# Patient Record
Sex: MALE | Race: ASIAN | Employment: FULL TIME | URBAN - METROPOLITAN AREA
[De-identification: names, ages, dates, MRNs, and addresses within clinical notes are randomized per-mention and may not be internally consistent; named-entity substitution may affect disease eponyms.]

---

## 2018-04-24 ENCOUNTER — OFFICE VISIT (OUTPATIENT)
Dept: FAMILY MEDICINE CLINIC | Age: 58
End: 2018-04-24

## 2018-04-24 VITALS
SYSTOLIC BLOOD PRESSURE: 120 MMHG | DIASTOLIC BLOOD PRESSURE: 80 MMHG | HEART RATE: 57 BPM | WEIGHT: 148 LBS | HEIGHT: 65 IN | BODY MASS INDEX: 24.66 KG/M2 | OXYGEN SATURATION: 96 %

## 2018-04-24 DIAGNOSIS — K50.90 CROHN'S DISEASE WITHOUT COMPLICATION, UNSPECIFIED GASTROINTESTINAL TRACT LOCATION (HCC): ICD-10-CM

## 2018-04-24 DIAGNOSIS — R53.83 FATIGUE, UNSPECIFIED TYPE: ICD-10-CM

## 2018-04-24 DIAGNOSIS — Z00.00 ANNUAL PHYSICAL EXAM: Primary | ICD-10-CM

## 2018-04-24 DIAGNOSIS — Z12.11 COLON CANCER SCREENING: ICD-10-CM

## 2018-04-24 PROCEDURE — 99386 PREV VISIT NEW AGE 40-64: CPT | Performed by: FAMILY MEDICINE

## 2018-04-24 RX ORDER — BUPRENORPHINE HYDROCHLORIDE AND NALOXONE HYDROCHLORIDE DIHYDRATE 8; 2 MG/1; MG/1
TABLET SUBLINGUAL
COMMUNITY
Start: 2018-03-12

## 2018-04-24 ASSESSMENT — PATIENT HEALTH QUESTIONNAIRE - PHQ9
1. LITTLE INTEREST OR PLEASURE IN DOING THINGS: 0
SUM OF ALL RESPONSES TO PHQ9 QUESTIONS 1 & 2: 0
2. FEELING DOWN, DEPRESSED OR HOPELESS: 0
SUM OF ALL RESPONSES TO PHQ QUESTIONS 1-9: 0

## 2018-05-01 ENCOUNTER — TELEPHONE (OUTPATIENT)
Dept: FAMILY MEDICINE CLINIC | Age: 58
End: 2018-05-01

## 2018-05-01 ENCOUNTER — NURSE ONLY (OUTPATIENT)
Dept: FAMILY MEDICINE CLINIC | Age: 58
End: 2018-05-01

## 2018-05-01 DIAGNOSIS — Z12.11 COLON CANCER SCREENING: ICD-10-CM

## 2018-05-01 DIAGNOSIS — K92.1 BLOOD IN STOOL: Primary | ICD-10-CM

## 2018-05-01 DIAGNOSIS — R53.83 FATIGUE, UNSPECIFIED TYPE: ICD-10-CM

## 2018-05-01 DIAGNOSIS — Z00.00 ANNUAL PHYSICAL EXAM: ICD-10-CM

## 2018-05-01 LAB
A/G RATIO: 1 (ref 1.1–2.2)
ALBUMIN SERPL-MCNC: 3.7 G/DL (ref 3.4–5)
ALP BLD-CCNC: 73 U/L (ref 40–129)
ALT SERPL-CCNC: <5 U/L (ref 10–40)
ANION GAP SERPL CALCULATED.3IONS-SCNC: 16 MMOL/L (ref 3–16)
AST SERPL-CCNC: 11 U/L (ref 15–37)
BASOPHILS ABSOLUTE: 0 K/UL (ref 0–0.2)
BASOPHILS RELATIVE PERCENT: 0.5 %
BILIRUB SERPL-MCNC: 0.3 MG/DL (ref 0–1)
BUN BLDV-MCNC: 8 MG/DL (ref 7–20)
CALCIUM SERPL-MCNC: 9.3 MG/DL (ref 8.3–10.6)
CHLORIDE BLD-SCNC: 101 MMOL/L (ref 99–110)
CHOLESTEROL, FASTING: 120 MG/DL (ref 0–199)
CO2: 25 MMOL/L (ref 21–32)
CONTROL: NORMAL
CREAT SERPL-MCNC: 0.9 MG/DL (ref 0.9–1.3)
EOSINOPHILS ABSOLUTE: 0.1 K/UL (ref 0–0.6)
EOSINOPHILS RELATIVE PERCENT: 1.8 %
FOLATE: 2.81 NG/ML (ref 4.78–24.2)
GFR AFRICAN AMERICAN: >60
GFR NON-AFRICAN AMERICAN: >60
GLOBULIN: 3.7 G/DL
GLUCOSE BLD-MCNC: 108 MG/DL (ref 70–99)
HCT VFR BLD CALC: 44.1 % (ref 40.5–52.5)
HDLC SERPL-MCNC: 42 MG/DL (ref 40–60)
HEMOCCULT STL QL: POSITIVE
HEMOGLOBIN: 14.4 G/DL (ref 13.5–17.5)
LDL CHOLESTEROL CALCULATED: 60 MG/DL
LYMPHOCYTES ABSOLUTE: 1.5 K/UL (ref 1–5.1)
LYMPHOCYTES RELATIVE PERCENT: 20.3 %
MCH RBC QN AUTO: 28.3 PG (ref 26–34)
MCHC RBC AUTO-ENTMCNC: 32.7 G/DL (ref 31–36)
MCV RBC AUTO: 86.5 FL (ref 80–100)
MONOCYTES ABSOLUTE: 0.5 K/UL (ref 0–1.3)
MONOCYTES RELATIVE PERCENT: 6.2 %
NEUTROPHILS ABSOLUTE: 5.2 K/UL (ref 1.7–7.7)
NEUTROPHILS RELATIVE PERCENT: 71.2 %
PDW BLD-RTO: 14.6 % (ref 12.4–15.4)
PLATELET # BLD: 339 K/UL (ref 135–450)
PMV BLD AUTO: 7.7 FL (ref 5–10.5)
POTASSIUM SERPL-SCNC: 4.5 MMOL/L (ref 3.5–5.1)
RBC # BLD: 5.1 M/UL (ref 4.2–5.9)
SODIUM BLD-SCNC: 142 MMOL/L (ref 136–145)
TOTAL PROTEIN: 7.4 G/DL (ref 6.4–8.2)
TRIGLYCERIDE, FASTING: 90 MG/DL (ref 0–150)
TSH REFLEX: 2.05 UIU/ML (ref 0.27–4.2)
VITAMIN B-12: 246 PG/ML (ref 211–911)
VITAMIN D 25-HYDROXY: 14.2 NG/ML
VLDLC SERPL CALC-MCNC: 18 MG/DL
WBC # BLD: 7.4 K/UL (ref 4–11)

## 2018-05-01 PROCEDURE — 82274 ASSAY TEST FOR BLOOD FECAL: CPT | Performed by: FAMILY MEDICINE

## 2018-05-02 LAB
ESTIMATED AVERAGE GLUCOSE: 122.6 MG/DL
HBA1C MFR BLD: 5.9 %

## 2018-05-08 ENCOUNTER — OFFICE VISIT (OUTPATIENT)
Dept: FAMILY MEDICINE CLINIC | Age: 58
End: 2018-05-08

## 2018-05-08 ENCOUNTER — TELEPHONE (OUTPATIENT)
Dept: FAMILY MEDICINE CLINIC | Age: 58
End: 2018-05-08

## 2018-05-08 VITALS
BODY MASS INDEX: 25.29 KG/M2 | DIASTOLIC BLOOD PRESSURE: 70 MMHG | OXYGEN SATURATION: 98 % | WEIGHT: 152 LBS | SYSTOLIC BLOOD PRESSURE: 120 MMHG | TEMPERATURE: 98.1 F | HEART RATE: 78 BPM

## 2018-05-08 DIAGNOSIS — R10.9 ABDOMINAL PAIN, UNSPECIFIED ABDOMINAL LOCATION: ICD-10-CM

## 2018-05-08 DIAGNOSIS — E55.9 VITAMIN D DEFICIENCY: ICD-10-CM

## 2018-05-08 DIAGNOSIS — R19.5 OCCULT GI BLEEDING: ICD-10-CM

## 2018-05-08 DIAGNOSIS — R73.03 PREDIABETES: Primary | ICD-10-CM

## 2018-05-08 DIAGNOSIS — E53.8 VITAMIN B12 DEFICIENCY: ICD-10-CM

## 2018-05-08 PROCEDURE — 99214 OFFICE O/P EST MOD 30 MIN: CPT | Performed by: FAMILY MEDICINE

## 2018-05-08 PROCEDURE — G8427 DOCREV CUR MEDS BY ELIG CLIN: HCPCS | Performed by: FAMILY MEDICINE

## 2018-05-08 PROCEDURE — G8419 CALC BMI OUT NRM PARAM NOF/U: HCPCS | Performed by: FAMILY MEDICINE

## 2018-05-08 PROCEDURE — 96372 THER/PROPH/DIAG INJ SC/IM: CPT | Performed by: FAMILY MEDICINE

## 2018-05-08 PROCEDURE — 3017F COLORECTAL CA SCREEN DOC REV: CPT | Performed by: FAMILY MEDICINE

## 2018-05-08 PROCEDURE — 4004F PT TOBACCO SCREEN RCVD TLK: CPT | Performed by: FAMILY MEDICINE

## 2018-05-08 RX ORDER — CYANOCOBALAMIN 1000 UG/ML
1000 INJECTION INTRAMUSCULAR; SUBCUTANEOUS
Status: SHIPPED | OUTPATIENT
Start: 2018-05-08 | End: 2018-08-06

## 2018-05-08 RX ADMIN — CYANOCOBALAMIN 1000 MCG: 1000 INJECTION INTRAMUSCULAR; SUBCUTANEOUS at 15:28

## 2018-05-18 ENCOUNTER — TELEPHONE (OUTPATIENT)
Dept: FAMILY MEDICINE CLINIC | Age: 58
End: 2018-05-18

## 2020-02-07 ENCOUNTER — OFFICE VISIT (OUTPATIENT)
Dept: FAMILY MEDICINE CLINIC | Age: 60
End: 2020-02-07
Payer: COMMERCIAL

## 2020-02-07 VITALS
HEIGHT: 65 IN | WEIGHT: 151 LBS | BODY MASS INDEX: 25.16 KG/M2 | DIASTOLIC BLOOD PRESSURE: 78 MMHG | SYSTOLIC BLOOD PRESSURE: 110 MMHG | OXYGEN SATURATION: 100 % | HEART RATE: 99 BPM

## 2020-02-07 DIAGNOSIS — Z00.00 ANNUAL PHYSICAL EXAM: ICD-10-CM

## 2020-02-07 LAB
A/G RATIO: 1.3 (ref 1.1–2.2)
ALBUMIN SERPL-MCNC: 4.4 G/DL (ref 3.4–5)
ALP BLD-CCNC: 75 U/L (ref 40–129)
ALT SERPL-CCNC: 6 U/L (ref 10–40)
ANION GAP SERPL CALCULATED.3IONS-SCNC: 14 MMOL/L (ref 3–16)
AST SERPL-CCNC: 12 U/L (ref 15–37)
BASOPHILS ABSOLUTE: 0 K/UL (ref 0–0.2)
BASOPHILS RELATIVE PERCENT: 0.5 %
BILIRUB SERPL-MCNC: 0.3 MG/DL (ref 0–1)
BUN BLDV-MCNC: 11 MG/DL (ref 7–20)
CALCIUM SERPL-MCNC: 10 MG/DL (ref 8.3–10.6)
CHLORIDE BLD-SCNC: 103 MMOL/L (ref 99–110)
CHOLESTEROL, FASTING: 112 MG/DL (ref 0–199)
CO2: 21 MMOL/L (ref 21–32)
CREAT SERPL-MCNC: 0.9 MG/DL (ref 0.9–1.3)
EOSINOPHILS ABSOLUTE: 0 K/UL (ref 0–0.6)
EOSINOPHILS RELATIVE PERCENT: 0.7 %
GFR AFRICAN AMERICAN: >60
GFR NON-AFRICAN AMERICAN: >60
GLOBULIN: 3.3 G/DL
GLUCOSE BLD-MCNC: 114 MG/DL (ref 70–99)
HCT VFR BLD CALC: 44 % (ref 40.5–52.5)
HDLC SERPL-MCNC: 51 MG/DL (ref 40–60)
HEMOGLOBIN: 15.1 G/DL (ref 13.5–17.5)
LDL CHOLESTEROL CALCULATED: 49 MG/DL
LYMPHOCYTES ABSOLUTE: 1.6 K/UL (ref 1–5.1)
LYMPHOCYTES RELATIVE PERCENT: 24.2 %
MCH RBC QN AUTO: 29.4 PG (ref 26–34)
MCHC RBC AUTO-ENTMCNC: 34.3 G/DL (ref 31–36)
MCV RBC AUTO: 85.6 FL (ref 80–100)
MONOCYTES ABSOLUTE: 0.4 K/UL (ref 0–1.3)
MONOCYTES RELATIVE PERCENT: 6.2 %
NEUTROPHILS ABSOLUTE: 4.7 K/UL (ref 1.7–7.7)
NEUTROPHILS RELATIVE PERCENT: 68.4 %
PDW BLD-RTO: 14.2 % (ref 12.4–15.4)
PLATELET # BLD: 285 K/UL (ref 135–450)
PMV BLD AUTO: 7.9 FL (ref 5–10.5)
POTASSIUM SERPL-SCNC: 4.1 MMOL/L (ref 3.5–5.1)
RBC # BLD: 5.14 M/UL (ref 4.2–5.9)
SODIUM BLD-SCNC: 138 MMOL/L (ref 136–145)
TOTAL PROTEIN: 7.7 G/DL (ref 6.4–8.2)
TRIGLYCERIDE, FASTING: 61 MG/DL (ref 0–150)
TSH REFLEX: 2.26 UIU/ML (ref 0.27–4.2)
VLDLC SERPL CALC-MCNC: 12 MG/DL
WBC # BLD: 6.8 K/UL (ref 4–11)

## 2020-02-07 PROCEDURE — 90715 TDAP VACCINE 7 YRS/> IM: CPT | Performed by: FAMILY MEDICINE

## 2020-02-07 PROCEDURE — 96372 THER/PROPH/DIAG INJ SC/IM: CPT | Performed by: FAMILY MEDICINE

## 2020-02-07 PROCEDURE — 90471 IMMUNIZATION ADMIN: CPT | Performed by: FAMILY MEDICINE

## 2020-02-07 PROCEDURE — 90472 IMMUNIZATION ADMIN EACH ADD: CPT | Performed by: FAMILY MEDICINE

## 2020-02-07 PROCEDURE — 99214 OFFICE O/P EST MOD 30 MIN: CPT | Performed by: FAMILY MEDICINE

## 2020-02-07 PROCEDURE — 90686 IIV4 VACC NO PRSV 0.5 ML IM: CPT | Performed by: FAMILY MEDICINE

## 2020-02-07 RX ORDER — CYANOCOBALAMIN 1000 UG/ML
1000 INJECTION INTRAMUSCULAR; SUBCUTANEOUS ONCE
Qty: 1 ML | Refills: 0 | Status: SHIPPED | OUTPATIENT
Start: 2020-02-07 | End: 2020-02-07 | Stop reason: CLARIF

## 2020-02-07 RX ORDER — CYANOCOBALAMIN 1000 UG/ML
1000 INJECTION INTRAMUSCULAR; SUBCUTANEOUS ONCE
Status: COMPLETED | OUTPATIENT
Start: 2020-02-07 | End: 2020-02-07

## 2020-02-07 RX ADMIN — CYANOCOBALAMIN 1000 MCG: 1000 INJECTION INTRAMUSCULAR; SUBCUTANEOUS at 11:59

## 2020-02-07 ASSESSMENT — PATIENT HEALTH QUESTIONNAIRE - PHQ9
2. FEELING DOWN, DEPRESSED OR HOPELESS: 0
SUM OF ALL RESPONSES TO PHQ9 QUESTIONS 1 & 2: 0
SUM OF ALL RESPONSES TO PHQ QUESTIONS 1-9: 0
1. LITTLE INTEREST OR PLEASURE IN DOING THINGS: 0
SUM OF ALL RESPONSES TO PHQ QUESTIONS 1-9: 0

## 2020-02-07 NOTE — PROGRESS NOTES
deficits, normal gait, normal speech      Assessment/Plan     61-year-old male with fatigue and diarrhea and history of Crohn's disease. Concerning for flare of Crohn's disease. Will refer to GI for further evaluation and treatment, as well as routine screening colonoscopy. Will check routine labs, including CBC and TSH to rule out organic cause of fatigue. Patient reports worsening memory. Given his age, is concerning for early onset dementia. Will check an MRI to rule out intracranial process. May ultimately need referral to neurology. Discharged in stable condition at 11:46 AM.    1. Need for prophylactic vaccination against Streptococcus pneumoniae (pneumococcus)    - Pneumococcal polysaccharide vaccine 23-valent PPSV23    2. Need for prophylactic vaccination against diphtheria-tetanus-pertussis (DTP)    - Tdap (age 6y and older) IM (Boostrix)    3. Annual physical exam    - CBC Auto Differential; Future  - Comprehensive Metabolic Panel; Future  - Hemoglobin A1C; Future  - Lipid, Fasting; Future  - TSH with Reflex; Future    4. Colon cancer screening      5. Vitamin B12 deficiency    - cyanocobalamin 1000 MCG/ML injection; Inject 1 mL into the muscle once for 1 dose  Dispense: 1 mL; Refill: 0    6. Memory problem    - MRI Brain W Contrast; Future    7.  Crohn's disease with complication, unspecified gastrointestinal tract location [facility])    - Shikha Barkley MD, Gastroenterology, [facility]         Orders Placed This Encounter   Procedures    MRI Brain W Contrast     Standing Status:   Future     Standing Expiration Date:   2/7/2021     Order Specific Question:   Reason for exam:     Answer:   Memory problem    Tdap (age 6y and older) IM (Boostrix)    Pneumococcal polysaccharide vaccine 23-valent PPSV23    INFLUENZA, QUADV, 3 YRS AND OLDER, IM PF, PREFILL SYR OR SDV, 0.5ML (AFLURIA QUADV, PF)    CBC Auto Differential     Standing Status:   Future     Standing Expiration Date: 2/7/2021    Comprehensive Metabolic Panel     Standing Status:   Future     Standing Expiration Date:   2/7/2021    Hemoglobin A1C     Standing Status:   Future     Standing Expiration Date:   2/7/2021    Lipid, Fasting     Standing Status:   Future     Standing Expiration Date:   2/7/2021    TSH with Reflex     Standing Status:   Future     Standing Expiration Date:   2/7/2021    LILIAM Giles MD, Gastroenterology, [facility]     Referral Priority:   Routine     Referral Type:   Eval and Treat     Referral Reason:   Specialty Services Required     Referred to Provider:   Ira Yoder MD     Requested Specialty:   Gastroenterology     Number of Visits Requested:   1       Return if symptoms worsen or fail to improve.     Martha Avila MD    2/7/2020  11:38 AM

## 2020-02-08 LAB
ESTIMATED AVERAGE GLUCOSE: 119.8 MG/DL
HBA1C MFR BLD: 5.8 %

## 2020-02-10 ENCOUNTER — TELEPHONE (OUTPATIENT)
Dept: FAMILY MEDICINE CLINIC | Age: 60
End: 2020-02-10

## 2020-02-25 ENCOUNTER — HOSPITAL ENCOUNTER (OUTPATIENT)
Dept: MRI IMAGING | Age: 60
Discharge: HOME | End: 2020-02-25
Payer: COMMERCIAL

## 2020-02-28 ENCOUNTER — TELEPHONE (OUTPATIENT)
Dept: FAMILY MEDICINE CLINIC | Age: 60
End: 2020-02-28

## 2020-03-10 ENCOUNTER — NURSE ONLY (OUTPATIENT)
Dept: FAMILY MEDICINE CLINIC | Age: 60
End: 2020-03-10
Payer: COMMERCIAL

## 2020-03-10 ENCOUNTER — TELEPHONE (OUTPATIENT)
Dept: FAMILY MEDICINE CLINIC | Age: 60
End: 2020-03-10

## 2020-03-10 PROCEDURE — 96372 THER/PROPH/DIAG INJ SC/IM: CPT | Performed by: FAMILY MEDICINE

## 2020-03-10 RX ORDER — CYANOCOBALAMIN 1000 UG/ML
1000 INJECTION INTRAMUSCULAR; SUBCUTANEOUS ONCE
Status: COMPLETED | OUTPATIENT
Start: 2020-03-10 | End: 2020-03-10

## 2020-03-10 RX ADMIN — CYANOCOBALAMIN 1000 MCG: 1000 INJECTION INTRAMUSCULAR; SUBCUTANEOUS at 12:59

## 2020-03-10 NOTE — TELEPHONE ENCOUNTER
Pt wants to know if you can result his colonoscopy? He also wants to know how you would like him to take prednisone?

## 2022-03-10 ENCOUNTER — OFFICE VISIT (OUTPATIENT)
Dept: FAMILY MEDICINE CLINIC | Age: 62
End: 2022-03-10
Payer: MEDICAID

## 2022-03-10 VITALS
BODY MASS INDEX: 25.66 KG/M2 | OXYGEN SATURATION: 97 % | HEART RATE: 71 BPM | SYSTOLIC BLOOD PRESSURE: 112 MMHG | HEIGHT: 65 IN | WEIGHT: 154 LBS | DIASTOLIC BLOOD PRESSURE: 76 MMHG

## 2022-03-10 DIAGNOSIS — M54.50 BILATERAL LOW BACK PAIN, UNSPECIFIED CHRONICITY, UNSPECIFIED WHETHER SCIATICA PRESENT: Primary | ICD-10-CM

## 2022-03-10 LAB
BILIRUBIN, POC: NORMAL
BLOOD URINE, POC: NORMAL
CLARITY, POC: NORMAL
COLOR, POC: YELLOW
GLUCOSE URINE, POC: NORMAL
KETONES, POC: NORMAL
LEUKOCYTE EST, POC: NORMAL
NITRITE, POC: NORMAL
PH, POC: 6
PROTEIN, POC: NORMAL
SPECIFIC GRAVITY, POC: 1.02
UROBILINOGEN, POC: 0.2

## 2022-03-10 PROCEDURE — 4004F PT TOBACCO SCREEN RCVD TLK: CPT | Performed by: NURSE PRACTITIONER

## 2022-03-10 PROCEDURE — 99213 OFFICE O/P EST LOW 20 MIN: CPT | Performed by: NURSE PRACTITIONER

## 2022-03-10 PROCEDURE — G8484 FLU IMMUNIZE NO ADMIN: HCPCS | Performed by: NURSE PRACTITIONER

## 2022-03-10 PROCEDURE — 81002 URINALYSIS NONAUTO W/O SCOPE: CPT | Performed by: NURSE PRACTITIONER

## 2022-03-10 PROCEDURE — G8419 CALC BMI OUT NRM PARAM NOF/U: HCPCS | Performed by: NURSE PRACTITIONER

## 2022-03-10 PROCEDURE — 3017F COLORECTAL CA SCREEN DOC REV: CPT | Performed by: NURSE PRACTITIONER

## 2022-03-10 PROCEDURE — G8427 DOCREV CUR MEDS BY ELIG CLIN: HCPCS | Performed by: NURSE PRACTITIONER

## 2022-03-10 RX ORDER — PREDNISONE 10 MG/1
TABLET ORAL
Qty: 36 TABLET | Refills: 0 | Status: SHIPPED | OUTPATIENT
Start: 2022-03-10 | End: 2022-03-19

## 2022-03-10 SDOH — ECONOMIC STABILITY: FOOD INSECURITY: WITHIN THE PAST 12 MONTHS, THE FOOD YOU BOUGHT JUST DIDN'T LAST AND YOU DIDN'T HAVE MONEY TO GET MORE.: NEVER TRUE

## 2022-03-10 SDOH — ECONOMIC STABILITY: FOOD INSECURITY: WITHIN THE PAST 12 MONTHS, YOU WORRIED THAT YOUR FOOD WOULD RUN OUT BEFORE YOU GOT MONEY TO BUY MORE.: NEVER TRUE

## 2022-03-10 ASSESSMENT — PATIENT HEALTH QUESTIONNAIRE - PHQ9
4. FEELING TIRED OR HAVING LITTLE ENERGY: 1
2. FEELING DOWN, DEPRESSED OR HOPELESS: 0
1. LITTLE INTEREST OR PLEASURE IN DOING THINGS: 0
7. TROUBLE CONCENTRATING ON THINGS, SUCH AS READING THE NEWSPAPER OR WATCHING TELEVISION: 1
10. IF YOU CHECKED OFF ANY PROBLEMS, HOW DIFFICULT HAVE THESE PROBLEMS MADE IT FOR YOU TO DO YOUR WORK, TAKE CARE OF THINGS AT HOME, OR GET ALONG WITH OTHER PEOPLE: 0
SUM OF ALL RESPONSES TO PHQ QUESTIONS 1-9: 3
SUM OF ALL RESPONSES TO PHQ9 QUESTIONS 1 & 2: 0
9. THOUGHTS THAT YOU WOULD BE BETTER OFF DEAD, OR OF HURTING YOURSELF: 0
3. TROUBLE FALLING OR STAYING ASLEEP: 0
6. FEELING BAD ABOUT YOURSELF - OR THAT YOU ARE A FAILURE OR HAVE LET YOURSELF OR YOUR FAMILY DOWN: 0
SUM OF ALL RESPONSES TO PHQ QUESTIONS 1-9: 3
SUM OF ALL RESPONSES TO PHQ QUESTIONS 1-9: 3
8. MOVING OR SPEAKING SO SLOWLY THAT OTHER PEOPLE COULD HAVE NOTICED. OR THE OPPOSITE, BEING SO FIGETY OR RESTLESS THAT YOU HAVE BEEN MOVING AROUND A LOT MORE THAN USUAL: 0
5. POOR APPETITE OR OVEREATING: 1
SUM OF ALL RESPONSES TO PHQ QUESTIONS 1-9: 3

## 2022-03-10 ASSESSMENT — ENCOUNTER SYMPTOMS
BACK PAIN: 1
GASTROINTESTINAL NEGATIVE: 1
RESPIRATORY NEGATIVE: 1

## 2022-03-10 ASSESSMENT — SOCIAL DETERMINANTS OF HEALTH (SDOH): HOW HARD IS IT FOR YOU TO PAY FOR THE VERY BASICS LIKE FOOD, HOUSING, MEDICAL CARE, AND HEATING?: SOMEWHAT HARD

## 2022-03-10 NOTE — PROGRESS NOTES
3/10/2022     Rita Hernandez (:  1960) is a 62 y.o. male, here for evaluation of the following medical concerns:    Chief Complaint   Patient presents with    Lower Back Pain     sharp pains, right side hurts worse, x2 days     Back Pain:  States started with right sided low back pain two days ago. Has had this in the past one time prior and took steroids and the discomfort was relieved. Denies injury or fever. He believes he has a kidney stone, however, he has never had a kidney stone. He does admit he has been lifting 50 pound rugs at work by himself recently. The pain is only when he moves. He denies radiation to the buttock or leg. He denies radiation around to the lower abdomen or scrotum. Denies urinary or bowel issues. Review of Systems   Constitutional: Negative. Respiratory: Negative. Cardiovascular: Negative. Gastrointestinal: Negative. Genitourinary: Negative. Negative for dysuria, flank pain, hematuria, penile discharge, penile pain, penile swelling, scrotal swelling, testicular pain and urgency. Musculoskeletal: Positive for back pain. Neurological: Negative. Prior to Visit Medications    Medication Sig Taking? Authorizing Provider   predniSONE (DELTASONE) 10 MG tablet Take 6 tablets by mouth daily for 3 days, THEN 4 tablets daily for 3 days, THEN 2 tablets daily for 3 days.  Yes FRANK Farah - CNP   buprenorphine-naloxone (SUBOXONE) 8-2 MG SUBL SL tablet  Yes Historical Provider, MD        Social History     Tobacco Use    Smoking status: Current Every Day Smoker     Packs/day: 1.50     Years: 20.00     Pack years: 30.00    Smokeless tobacco: Never Used   Substance Use Topics    Alcohol use: Yes     Comment: about once a month    Drug use: No     Comment: former         Vitals:    03/10/22 1444   BP: 112/76   Pulse: 71   SpO2: 97%   Weight: 154 lb (69.9 kg)   Height: 5' 5\" (1.651 m)     Estimated body mass index is 25.63 kg/m² as calculated from the following:    Height as of this encounter: 5' 5\" (1.651 m). Weight as of this encounter: 154 lb (69.9 kg). Physical Exam  Vitals and nursing note reviewed. Constitutional:       General: He is not in acute distress. Appearance: Normal appearance. He is normal weight. He is not ill-appearing. Cardiovascular:      Rate and Rhythm: Normal rate and regular rhythm. Heart sounds: Normal heart sounds, S1 normal and S2 normal. No murmur heard. Pulmonary:      Effort: Pulmonary effort is normal.      Breath sounds: Normal breath sounds and air entry. Abdominal:      General: Abdomen is flat. Bowel sounds are normal.      Palpations: Abdomen is soft. Tenderness: There is no abdominal tenderness. There is right CVA tenderness. There is no left CVA tenderness. Comments: Palpating on the right CVA creates spasms   Musculoskeletal:      Lumbar back: Spasms and tenderness present. No swelling, edema, deformity, signs of trauma or bony tenderness. Decreased range of motion. Negative right straight leg raise test and negative left straight leg raise test. No scoliosis. Back:    Skin:     General: Skin is warm and dry. Capillary Refill: Capillary refill takes less than 2 seconds. Neurological:      General: No focal deficit present. Mental Status: He is alert. Psychiatric:         Mood and Affect: Mood normal.         ASSESSMENT/PLAN:  1. Bilateral low back pain, unspecified chronicity, unspecified whether sciatica present  Unlikely renal stone, however, his urine is positive for blood so can not rule this out although it is not a classic presentation so I will order a KUB and a urine culture. Patient already takes Suboxone for pain. Believe more likely this is a muscle strain, will treat with steroids as this has helped in the past with this issue. Will wait for KUB results and culture.   - POCT Urinalysis no Micro  - Culture, Urine  - XR ABDOMEN (KUB) (SINGLE AP VIEW); Future  - predniSONE (DELTASONE) 10 MG tablet; Take 6 tablets by mouth daily for 3 days, THEN 4 tablets daily for 3 days, THEN 2 tablets daily for 3 days. Dispense: 36 tablet; Refill: 0    Return in about 4 weeks (around 4/7/2022).

## 2022-03-10 NOTE — PATIENT INSTRUCTIONS
back, point your toes, or bend either knee. Keep one heel touching the floor and the other heel touching the wall. 4. Repeat with your other leg. 5. Do 2 to 4 times for each leg. Hip flexor stretch    1. Kneel on the floor with one knee bent and one leg behind you. Place your forward knee over your foot. Keep your other knee touching the floor. 2. Slowly push your hips forward until you feel a stretch in the upper thigh of your rear leg. 3. Hold the stretch for at least 15 to 30 seconds. Repeat with your other leg. 4. Do 2 to 4 times on each side. Wall sit    1. Stand with your back 10 to 12 inches away from a wall. 2. Lean into the wall until your back is flat against it. 3. Slowly slide down until your knees are slightly bent, pressing your lower back into the wall. 4. Hold for about 6 seconds, then slide back up the wall. 5. Repeat 8 to 12 times. Follow-up care is a key part of your treatment and safety. Be sure to make and go to all appointments, and call your doctor if you are having problems. It's also a good idea to know your test results and keep a list of the medicines you take. Where can you learn more? Go to [email or web address] and sign in to your Bioceptive account. Enter S142 in the [facility] box to learn more about \"Low Back Pain: Exercises. \"     If you do not have an account, please click on the \"Sign Up Now\" link. Current as of: July 1, 2021               Content Version: 13.1  © 2006-2021 Healthwise, Incorporated. Care instructions adapted under license by [facility] [employer] [facility] ([facility]). If you have questions about a medical condition or this instruction, always ask your healthcare professional. [place] [address] any warranty or liability for your use of this information. Patient Education        Acute Low Back Pain: Exercises  Introduction  Here are some examples of typical rehabilitation exercises for your condition.  Start each exercise slowly. Ease off the exercise if you start to have pain. Your doctor or physical therapist will tell you when you can start these exercises and which ones will work best for you. When you are not being active, find a comfortable position for rest. Some people are comfortable on the floor or a medium-firm bed with a small pillow under their head and another under their knees. Some people prefer to lie on their side with a pillow between their knees. Don't stay in one position for too long. Take short walks (10 to 20 minutes) every 2 to 3 hours. Avoid slopes, hills, and stairs until you feel better. Walk only distances you can manage without pain, especially leg pain. How to do the exercises  Back stretches    1. Get down on your hands and knees on the floor. 2. Relax your head and allow it to droop. Round your back up toward the ceiling until you feel a nice stretch in your upper, middle, and lower back. Hold this stretch for as long as it feels comfortable, or about 15 to 30 seconds. 3. Return to the starting position with a flat back while you are on your hands and knees. 4. Let your back sway by pressing your stomach toward the floor. Lift your buttocks toward the ceiling. 5. Hold this position for 15 to 30 seconds. 6. Repeat 2 to 4 times. Follow-up care is a key part of your treatment and safety. Be sure to make and go to all appointments, and call your doctor if you are having problems. It's also a good idea to know your test results and keep a list of the medicines you take. Where can you learn more? Go to [email or web address] org and sign in to your [employer] account. Enter V311 in the e[employer]Way box to learn more about \"Acute Low Back Pain: Exercises. \"     If you do not have an account, please click on the \"Sign Up Now\" link. Current as of: September 8, 2021               Content Version: 13.1  © 6385-9120 Healthwise, Incorporated.    Care instructions adapted under license by [facility] ([facility]). If you have questions about a medical condition or this instruction, always ask your healthcare professional. [place] [address] any warranty or liability for your use of this information.

## 2022-03-11 ENCOUNTER — HOSPITAL ENCOUNTER (OUTPATIENT)
Dept: GENERAL RADIOLOGY | Age: 62
Discharge: HOME | End: 2022-03-11
Payer: MEDICAID

## 2022-03-11 ENCOUNTER — HOSPITAL ENCOUNTER (OUTPATIENT)
Age: 62
Discharge: HOME | End: 2022-03-11
Payer: MEDICAID

## 2022-03-11 DIAGNOSIS — M54.50 BILATERAL LOW BACK PAIN, UNSPECIFIED CHRONICITY, UNSPECIFIED WHETHER SCIATICA PRESENT: ICD-10-CM

## 2022-03-11 LAB — URINE CULTURE, ROUTINE: NORMAL

## 2022-03-11 PROCEDURE — 74018 RADEX ABDOMEN 1 VIEW: CPT

## 2022-03-15 ENCOUNTER — OFFICE VISIT (OUTPATIENT)
Dept: FAMILY MEDICINE CLINIC | Age: 62
End: 2022-03-15
Payer: MEDICAID

## 2022-03-15 VITALS
SYSTOLIC BLOOD PRESSURE: 100 MMHG | BODY MASS INDEX: 26.13 KG/M2 | OXYGEN SATURATION: 99 % | DIASTOLIC BLOOD PRESSURE: 80 MMHG | HEART RATE: 55 BPM | WEIGHT: 157 LBS

## 2022-03-15 DIAGNOSIS — E53.8 VITAMIN B12 DEFICIENCY: ICD-10-CM

## 2022-03-15 DIAGNOSIS — L98.9 BENIGN SKIN LESION OF FACE: ICD-10-CM

## 2022-03-15 DIAGNOSIS — Z00.00 ANNUAL PHYSICAL EXAM: ICD-10-CM

## 2022-03-15 DIAGNOSIS — R31.29 MICROSCOPIC HEMATURIA: Primary | ICD-10-CM

## 2022-03-15 LAB
BILIRUBIN, POC: NORMAL
BLOOD URINE, POC: NORMAL
CLARITY, POC: CLEAR
COLOR, POC: YELLOW
GLUCOSE URINE, POC: NORMAL
KETONES, POC: NORMAL
LEUKOCYTE EST, POC: NORMAL
NITRITE, POC: NORMAL
PH, POC: 5.5
PROTEIN, POC: NORMAL
SPECIFIC GRAVITY, POC: 1.02
UROBILINOGEN, POC: 0.2

## 2022-03-15 PROCEDURE — 99214 OFFICE O/P EST MOD 30 MIN: CPT | Performed by: FAMILY MEDICINE

## 2022-03-15 PROCEDURE — 81002 URINALYSIS NONAUTO W/O SCOPE: CPT | Performed by: FAMILY MEDICINE

## 2022-03-15 PROCEDURE — G8427 DOCREV CUR MEDS BY ELIG CLIN: HCPCS | Performed by: FAMILY MEDICINE

## 2022-03-15 PROCEDURE — 4004F PT TOBACCO SCREEN RCVD TLK: CPT | Performed by: FAMILY MEDICINE

## 2022-03-15 PROCEDURE — G8419 CALC BMI OUT NRM PARAM NOF/U: HCPCS | Performed by: FAMILY MEDICINE

## 2022-03-15 PROCEDURE — G8484 FLU IMMUNIZE NO ADMIN: HCPCS | Performed by: FAMILY MEDICINE

## 2022-03-15 PROCEDURE — 3017F COLORECTAL CA SCREEN DOC REV: CPT | Performed by: FAMILY MEDICINE

## 2022-03-15 RX ORDER — CYANOCOBALAMIN 1000 UG/ML
1000 INJECTION INTRAMUSCULAR; SUBCUTANEOUS ONCE
Status: COMPLETED | OUTPATIENT
Start: 2022-03-15 | End: 2022-03-15

## 2022-03-15 RX ADMIN — CYANOCOBALAMIN 1000 MCG: 1000 INJECTION INTRAMUSCULAR; SUBCUTANEOUS at 11:28

## 2022-03-15 NOTE — PROGRESS NOTES
[address] Note    Date: 3/15/2022                                               Jojo Villegas:     Chief Complaint   Patient presents with    Rash     on head    Discuss Medications     jody saunders needs more prednisone       HPI   Rash on R temple that he first noticed 8-9 months ago. Is not painful or itchy. May be getting a little bigger. Pt is here for f/u on R flank pain. Was treated with prednisone last week, symptoms have resolved. Pt had microscopic hematuria at that time, was not treated for kidney stone. There are no problems to display for this patient. Past Medical History:   Diagnosis Date    Crohn's disease [facility])        Current Outpatient Medications   Medication Sig Dispense Refill    predniSONE (DELTASONE) 10 MG tablet Take 6 tablets by mouth daily for 3 days, THEN 4 tablets daily for 3 days, THEN 2 tablets daily for 3 days. 36 tablet 0    buprenorphine-naloxone (SUBOXONE) 8-2 MG SUBL SL tablet        No current facility-administered medications for this visit. No Known Allergies    Review of Systems   No fever, no cough    Vitals:  /80   Pulse 55   Wt 157 lb (71.2 kg)   SpO2 99%   BMI 26.13 kg/m²     Wt Readings from Last 3 Encounters:   03/15/22 157 lb (71.2 kg)   03/10/22 154 lb (69.9 kg)   02/07/20 151 lb (68.5 kg)        Physical Exam   General:  Well-appearing, NAD, alert, non-toxic  HEENT:  Normocephalic, atraumatic. CHEST/LUNGS: No dyspnea  EXTREMETIES: Normal movement of all extremities. No edema. No joint swelling. SKIN: + Hyperpigmented textured lesion with regular border on right temple. About 1.5 cm in diameter  PSYCH:  A+O x 3; normal affect  NEURO:  GCS 15, CN2-12 grossly intact, no focal motor/sensory deficits, normal gait, normal speech     Assessment/Plan     28-year-old male with skin lesion of right temple. Likely a benign seborrheic keratosis. Recommend routine monitoring.     Patient had recent flank pain that has resolved. Continues to have microscopic hematuria, though it has decreased. We will recheck UA in 2 weeks to ensure resolution. Patient has history of vitamin D deficiency. Will repeat B12 injection today. Follow-up in the few months for annual physical.    Discharged in stable condition. 1. Microscopic hematuria  -     POCT Urinalysis no Micro  -     Urinalysis; Future  2. Annual physical exam  -     CBC with Auto Differential; Future  -     Comprehensive Metabolic Panel; Future  -     Hemoglobin A1C; Future  -     Hepatitis C Antibody; Future  -     HIV Screen; Future  -     Lipid, Fasting; Future  -     TSH with Reflex; Future  3. Vitamin B12 deficiency  -     cyanocobalamin injection 1,000 mcg; 1,000 mcg, IntraMUSCular, ONCE, On Tue 3/15/22 at 1145, For 1 dose  4. Benign skin lesion of face       Orders Placed This Encounter   Procedures    CBC with Auto Differential     Standing Status:   Future     Standing Expiration Date:   3/15/2023    Comprehensive Metabolic Panel     Standing Status:   Future     Standing Expiration Date:   3/15/2023    Hemoglobin A1C     Standing Status:   Future     Standing Expiration Date:   3/15/2023    Hepatitis C Antibody     Standing Status:   Future     Standing Expiration Date:   3/15/2023    HIV Screen     Standing Status:   Future     Standing Expiration Date:   3/15/2023    Lipid, Fasting     Standing Status:   Future     Standing Expiration Date:   3/15/2023    TSH with Reflex     Standing Status:   Future     Standing Expiration Date:   3/15/2023    Urinalysis     Standing Status:   Future     Standing Expiration Date:   3/15/2023    POCT Urinalysis no Micro       No follow-ups on file.     Mikki Alcaraz MD, MD    3/15/2022  1:13 PM

## 2022-03-17 DIAGNOSIS — M54.50 BILATERAL LOW BACK PAIN, UNSPECIFIED CHRONICITY, UNSPECIFIED WHETHER SCIATICA PRESENT: ICD-10-CM

## 2022-03-18 RX ORDER — PREDNISONE 10 MG/1
TABLET ORAL
Qty: 36 TABLET | Refills: 0 | OUTPATIENT
Start: 2022-03-18 | End: 2022-03-27

## 2022-04-19 ENCOUNTER — OFFICE VISIT (OUTPATIENT)
Dept: FAMILY MEDICINE CLINIC | Age: 62
End: 2022-04-19
Payer: MEDICAID

## 2022-04-19 VITALS
WEIGHT: 158 LBS | OXYGEN SATURATION: 100 % | DIASTOLIC BLOOD PRESSURE: 78 MMHG | HEIGHT: 65 IN | HEART RATE: 75 BPM | BODY MASS INDEX: 26.33 KG/M2 | SYSTOLIC BLOOD PRESSURE: 128 MMHG

## 2022-04-19 DIAGNOSIS — M65.312 TRIGGER FINGER OF ALL DIGITS OF BOTH HANDS: ICD-10-CM

## 2022-04-19 DIAGNOSIS — M65.331 TRIGGER FINGER OF ALL DIGITS OF BOTH HANDS: ICD-10-CM

## 2022-04-19 DIAGNOSIS — M65.352 TRIGGER FINGER OF ALL DIGITS OF BOTH HANDS: ICD-10-CM

## 2022-04-19 DIAGNOSIS — G89.29 CHRONIC RIGHT-SIDED LOW BACK PAIN WITH RIGHT-SIDED SCIATICA: Primary | ICD-10-CM

## 2022-04-19 DIAGNOSIS — R31.9 HEMATURIA, UNSPECIFIED TYPE: ICD-10-CM

## 2022-04-19 DIAGNOSIS — M65.351 TRIGGER FINGER OF ALL DIGITS OF BOTH HANDS: ICD-10-CM

## 2022-04-19 DIAGNOSIS — M65.311 TRIGGER FINGER OF ALL DIGITS OF BOTH HANDS: ICD-10-CM

## 2022-04-19 DIAGNOSIS — M65.332 TRIGGER FINGER OF ALL DIGITS OF BOTH HANDS: ICD-10-CM

## 2022-04-19 DIAGNOSIS — M65.321 TRIGGER FINGER OF ALL DIGITS OF BOTH HANDS: ICD-10-CM

## 2022-04-19 DIAGNOSIS — M65.341 TRIGGER FINGER OF ALL DIGITS OF BOTH HANDS: ICD-10-CM

## 2022-04-19 DIAGNOSIS — M65.342 TRIGGER FINGER OF ALL DIGITS OF BOTH HANDS: ICD-10-CM

## 2022-04-19 DIAGNOSIS — M54.50 BILATERAL LOW BACK PAIN, UNSPECIFIED CHRONICITY, UNSPECIFIED WHETHER SCIATICA PRESENT: ICD-10-CM

## 2022-04-19 DIAGNOSIS — M65.322 TRIGGER FINGER OF ALL DIGITS OF BOTH HANDS: ICD-10-CM

## 2022-04-19 DIAGNOSIS — M54.41 CHRONIC RIGHT-SIDED LOW BACK PAIN WITH RIGHT-SIDED SCIATICA: Primary | ICD-10-CM

## 2022-04-19 PROCEDURE — 99213 OFFICE O/P EST LOW 20 MIN: CPT | Performed by: NURSE PRACTITIONER

## 2022-04-19 PROCEDURE — 3017F COLORECTAL CA SCREEN DOC REV: CPT | Performed by: NURSE PRACTITIONER

## 2022-04-19 PROCEDURE — G8427 DOCREV CUR MEDS BY ELIG CLIN: HCPCS | Performed by: NURSE PRACTITIONER

## 2022-04-19 PROCEDURE — 4004F PT TOBACCO SCREEN RCVD TLK: CPT | Performed by: NURSE PRACTITIONER

## 2022-04-19 PROCEDURE — 81002 URINALYSIS NONAUTO W/O SCOPE: CPT | Performed by: NURSE PRACTITIONER

## 2022-04-19 PROCEDURE — G8419 CALC BMI OUT NRM PARAM NOF/U: HCPCS | Performed by: NURSE PRACTITIONER

## 2022-04-19 RX ORDER — METHYLPREDNISOLONE 4 MG/1
TABLET ORAL
Qty: 1 KIT | Refills: 0 | Status: SHIPPED | OUTPATIENT
Start: 2022-04-19 | End: 2022-04-25

## 2022-04-19 RX ORDER — AMOXICILLIN AND CLAVULANATE POTASSIUM 875; 125 MG/1; MG/1
TABLET, FILM COATED ORAL
COMMUNITY
Start: 2022-04-14

## 2022-04-19 RX ORDER — CHLORHEXIDINE GLUCONATE 0.12 MG/ML
RINSE ORAL
COMMUNITY
Start: 2022-03-22 | End: 2022-04-19

## 2022-04-19 RX ORDER — PREDNISONE 10 MG/1
TABLET ORAL
Qty: 36 TABLET | Refills: 0 | OUTPATIENT
Start: 2022-04-19 | End: 2022-04-28

## 2022-04-19 ASSESSMENT — ENCOUNTER SYMPTOMS
BACK PAIN: 1
RESPIRATORY NEGATIVE: 1
GASTROINTESTINAL NEGATIVE: 1

## 2022-04-19 NOTE — TELEPHONE ENCOUNTER
Pt notified. Noemy Greenberg He said  His pain is still not gone and is asking for more pills of the medrol dose lobo. . he said the prednisone works better for him

## 2022-04-19 NOTE — PROGRESS NOTES
2022     Talisha Guan (:  1960) is a 62 y.o. male, here for evaluation of the following medical concerns:    Chief Complaint   Patient presents with    Arthritis     hands and legs    Lower Back Pain     right side     Patient arrives today for concerns with \"finger catching\" daily. States he wakes wit this often and has to pull his fingers to a straight position in the morning. states he feels a clicking sensation when doing this. Usually it is his 2nd - 5th digits bilaterally. He also will have occasional issues/pain in his toes. Right Flank Pain:  Patient again returns for right flank pain. He is requesting steroids again for this. Radiates into right buttock. He feels this is related to his job being so physical.    Review of Systems   Constitutional: Negative. Respiratory: Negative. Cardiovascular: Negative. Gastrointestinal: Negative. Genitourinary: Negative. Musculoskeletal: Positive for back pain. Fingers are getting stuck   Neurological: Negative. Prior to Visit Medications    Medication Sig Taking? Authorizing Provider   amoxicillin-clavulanate (AUGMENTIN) 875-125 MG per tablet TAKE 1 TABLET BY MOUTH EVERY TWELVE HOURS UNTIL GONE Yes Historical Provider, MD   methylPREDNISolone (MEDROL DOSEPACK) 4 MG tablet Take by mouth.  Yes Sol Mancilla, APRN - CNP   buprenorphine-naloxone (SUBOXONE) 8-2 MG SUBL SL tablet  Yes Historical Provider, MD        Social History     Tobacco Use    Smoking status: Current Every Day Smoker     Packs/day: 1.50     Years: 20.00     Pack years: 30.00    Smokeless tobacco: Never Used   Substance Use Topics    Alcohol use: Yes     Comment: about once a month    Drug use: No     Comment: former         Vitals:    22 0838   BP: 128/78   Pulse: 75   SpO2: 100%   Weight: 158 lb (71.7 kg)   Height: 5' 5\" (1.651 m)     Estimated body mass index is 26.29 kg/m² as calculated from the following:    Height as of this encounter: 5' 5\" (1.651 m). Weight as of this encounter: 158 lb (71.7 kg). Physical Exam  Vitals and nursing note reviewed. Constitutional:       General: He is not in acute distress. Appearance: Normal appearance. He is normal weight. He is not ill-appearing. Cardiovascular:      Rate and Rhythm: Normal rate and regular rhythm. Heart sounds: Normal heart sounds, S1 normal and S2 normal. No murmur heard. Pulmonary:      Effort: Pulmonary effort is normal.      Breath sounds: Normal breath sounds and air entry. Abdominal:      General: Abdomen is flat. Bowel sounds are normal.      Palpations: Abdomen is soft. Musculoskeletal:      Thoracic back: No tenderness or bony tenderness. Decreased range of motion. Back:    Skin:     General: Skin is warm and dry. Capillary Refill: Capillary refill takes less than 2 seconds. Neurological:      General: No focal deficit present. Mental Status: He is alert. Psychiatric:         Mood and Affect: Mood normal.         ASSESSMENT/PLAN:  1. Chronic right-sided low back pain with right-sided sciatica  Cautioned patient on repeat treatment with steroids. [facility], patient prefers to see chiropractor and is working on finding one who will accept his insurance. - methylPREDNISolone (MEDROL DOSEPACK) 4 MG tablet; Take by mouth. Dispense: 1 kit; Refill: 0    2. Hematuria, unspecified type  Etiology? ? Discussed concern with patient, agrees to CT scan  Will call to schedule, number given  - CT ABDOMEN PELVIS W WO CONTRAST Additional Contrast? None; Future  - POCT Urinalysis no Micro  - AFL - Anthony Koch MD, Urology, [facility]    3. Trigger finger of all digits of both hands  - Amena - Luis Alfredo Ordonez MD, Orthopedic Surgery, [facility]    Return in about 4 weeks (around 5/17/2022), or if symptoms worsen or fail to improve, for hematuria/back pain.

## 2022-04-19 NOTE — TELEPHONE ENCOUNTER
predniSONE (DELTASONE) 10 MG tablet    Pt states wrong dosage was sent on this medication. He says he takes 10MG, not 4MG    Pt is asking for 10MG tablets to be sent to the pharmacy.      Please call pt and advise once complete

## 2022-04-20 DIAGNOSIS — M54.50 BILATERAL LOW BACK PAIN, UNSPECIFIED CHRONICITY, UNSPECIFIED WHETHER SCIATICA PRESENT: Primary | ICD-10-CM

## 2022-04-20 DIAGNOSIS — M54.41 CHRONIC RIGHT-SIDED LOW BACK PAIN WITH RIGHT-SIDED SCIATICA: ICD-10-CM

## 2022-04-20 DIAGNOSIS — G89.29 CHRONIC RIGHT-SIDED LOW BACK PAIN WITH RIGHT-SIDED SCIATICA: ICD-10-CM

## 2022-04-20 NOTE — PROGRESS NOTES
The patient calls today stating if he does not get the steroid doses he needs he will come to the office at [address] [facility] and wait until it is given to him. Called patient and explained AGAIN that we can not continue to do high dose steroids for his issue and he should follow up with Ori Garcia. He again states he took two days worth of his medrol dose pack yesterday (40mg) and needs more medication. After a very lengthy discussion of poor outcomes of steroid overuse, he agrees to be seen by [facility]. Referral placed.

## 2022-04-20 NOTE — TELEPHONE ENCOUNTER
Pt said that he is in a lot of pain and cannot walk. . he said that he is almost out of his medrol dos lobo. Glenn Founds  He said that he would like a call from you ABEL fenton

## 2022-04-29 ENCOUNTER — HOSPITAL ENCOUNTER (OUTPATIENT)
Dept: PHYSICAL THERAPY | Age: 62
Setting detail: THERAPIES SERIES
Discharge: HOME | End: 2022-04-29
Payer: MEDICAID

## 2022-04-29 PROCEDURE — 97161 PT EVAL LOW COMPLEX 20 MIN: CPT

## 2022-04-29 PROCEDURE — 97110 THERAPEUTIC EXERCISES: CPT

## 2022-04-29 NOTE — FLOWSHEET NOTE
[address], [employer]    Physical Therapy Treatment Note/ Progress Report:     Date:  2022    Patient Name:  Lorenzo Walker    :  1960  MRN: [id number]  Medical/Treatment Diagnosis Information:  Diagnosis: Bilateral low back pain; unspecified chroncity, unspecified whether sciatica present M54.50; Chronic right sided low back pain with right-sided sciatica M54,41, G89.29  Treatment Diagnosis: Increased LBP, decreased LE strength, decreased LE endurance, decreased LE ROM  Insurance/Certification information:  PT Insurance Information: [employer]  Physician Information:   [id number] [place] [address] signed (Y/N):     Date of Patient follow up with Physician:      Progress Report: []  Yes  [x]  No     Functional Scale: FOTO = 43   Date: 22    Date Range for reporting period:  Beginnin22  Ending:      Progress report due (10 Rx/or 30 days whichever is less):      Recertification due (POC duration/ or 90 days whichever is less):  22    Visit # Insurance Allowable Auth Needed   1 20 [x]Yes    []No     Pain level:  -10/10     SUBJECTIVE:  See eval    OBJECTIVE: See eval   Observation:    Test measurements:      RESTRICTIONS/PRECAUTIONS: Anxiety    Treatment based classification: Flexion directional preference    Exercises/Interventions:     Therapeutic Ex 10' Wt / Resistance sets/sec reps notes   Child's Pose   3 30 sec     Heel rocking   2 10           HEP and education                                                               Therapeutic Activities                                                               Manual Intervention 9'              Prone PA  6'  Gr I-II   GISTM/STM       Lumbar Manip       SI Manip  3'  R    Hip belt mobs       Hip LA distraction              NMR re-education                                             Therapeutic Exercise and NMR EXR  [x] (19217) Provided verbal/tactile cueing for activities related to strengthening, flexibility, endurance, ROM  for improvements in proximal hip and core control with self care, mobility, lifting and ambulation.  [] (52882) Provided verbal/tactile cueing for activities related to improving balance, coordination, kinesthetic sense, posture, motor skill, proprioception  to assist with core control in self care, mobility, lifting, and ambulation. Therapeutic Activities:    [] (41640 or 46800) Provided verbal/tactile cueing for activities related to improving balance, coordination, kinesthetic sense, posture, motor skill, proprioception and motor activation to allow for proper function  with self care and ADLs  [] (16693) Provided training and instruction to the patient for proper core and proximal hip recruitment and positioning with ambulation re-education     Home Exercise Program:    [x] (09603) Reviewed/Progressed HEP activities related to strengthening, flexibility, endurance, ROM of core, proximal hip and LE for functional self-care, mobility, lifting and ambulation   [] (13006) Reviewed/Progressed HEP activities related to improving balance, coordination, kinesthetic sense, posture, motor skill, proprioception of core, proximal hip and LE for self care, mobility, lifting, and ambulation      Manual Treatments:  PROM / STM / Oscillations-Mobs:  G-I, II, III, IV (PA's, Inf., Post.)  [x] (51591) Provided manual therapy to mobilize proximal hip and LS spine soft tissue/joints for the purpose of modulating pain, promoting relaxation,  increasing ROM, reducing/eliminating soft tissue swelling/inflammation/restriction, improving soft tissue extensibility and allowing for proper ROM for normal function with self care, mobility, lifting and ambulation.      Modalities: None        Charges:  Timed Code Treatment Minutes: 19   Total Treatment Minutes: 50       [x] EVAL (LOW) 02970 (typically 20 minutes face-to-face)  [] EVAL (MOD) 12159 (typically 30 minutes face-to-face)  [] EVAL (HIGH) 26272 (typically 45 minutes face-to-face)  [] RE-EVAL     [] LP(02535) x  1   [] IONTO (61972)  [] NMR (03616) x     [] VASO (50796)  [] Manual (07161) x     [] Other:  [] TA (24093)x     [] Mech Traction (05732)  [] ES(attended) (06175)     [] ES (un) (50512): If BWC Please Indicate Time In/Out and Total Minutes  CPT Code Time in Time out Total Min                                          GOALS:  Patient stated goal: \"Decrease my pain\"  []? Progressing: []? Met: [x]? Not Met: []? Adjusted     Therapist goals for Patient:   Short Term Goals: To be achieved in: 2 weeks  1. Independent in HEP and progression per patient tolerance, in order to prevent re-injury. []? Progressing: []? Met: [x]? Not Met: []? Adjusted  2. Patient will have a decrease in pain to facilitate improvement in movement, function, and ADLs as indicated by Functional Deficits. []? Progressing: []? Met: [x]? Not Met: []? Adjusted     Long Term Goals: To be achieved in: 8 weeks  1. FOTO of 60 or more to assist with reaching prior level of function. []? Progressing: []? Met: [x]? Not Met: []? Adjusted  2. Patient will demonstrate increased AROM to WNL, good LS mobility, good hip ROM to allow for proper joint functioning as indicated by patients Functional Deficits. []? Progressing: []? Met: [x]? Not Met: []? Adjusted  3. Patient will demonstrate an increase in Strength to good proximal hip and core activation to allow for proper functional mobility as indicated by patients Functional Deficits. []? Progressing: []? Met: [x]? Not Met: []? Adjusted  4. Patient will return to work without increased symptoms or restriction. []? Progressing: []? Met: [x]? Not Met: []? Adjusted    Progression Towards Functional goals:  [] Patient is progressing as expected towards functional goals listed. [] Progression is slowed due to complexities listed. [] Progression has been slowed due to co-morbidities.   [x] Plan just implemented, too soon to assess goals progression  [] Other:     ASSESSMENT:  See eval    Treatment/Activity Tolerance:  [x] Patient tolerated treatment well [] Patient limited by fatique  [] Patient limited by pain  [] Patient limited by other medical complications  [] Other:     Overall Progression Towards Functional goals/ Treatment Progress Update:  [] Patient is progressing as expected towards functional goals listed. [] Progression is slowed due to complexities/Impairments listed. [] Progression has been slowed due to co-morbidities. [x] Plan just implemented, too soon to assess goals progression <30days   [] Goals require adjustment due to lack of progress  [] Patient is not progressing as expected and requires additional follow up with physician  [] Other:    Prognosis for POC: [x] Good [] Fair  [] Poor    Patient requires continued skilled intervention: [x] Yes  [] No        PLAN: See eval  [] Continue per plan of care [] Alter current plan (see comments)  [x] Plan of care initiated [] Hold pending MD visit [] Discharge    Electronically signed by: Marimar Barnett, SPT     Therapist was present, directed the patient's care, made skilled judgement, and was responsible for assessment and treatment of the patient. Joanna Barajas, PT    Note: If patient does not return for scheduled/recommended follow up visits, this note will serve as a discharge from care along with the most recent update on progress.
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
## 2022-05-03 ENCOUNTER — HOSPITAL ENCOUNTER (OUTPATIENT)
Dept: CT IMAGING | Age: 62
Discharge: HOME | End: 2022-05-03
Payer: MEDICAID

## 2022-05-03 DIAGNOSIS — R31.9 HEMATURIA, UNSPECIFIED TYPE: ICD-10-CM

## 2022-05-03 PROBLEM — K50.90 CROHN'S DISEASE (HCC): Status: ACTIVE | Noted: 2022-05-03

## 2022-05-03 LAB
CREAT SERPL-MCNC: 1 MG/DL (ref 0.8–1.3)
GFR AFRICAN AMERICAN: >60
GFR NON-AFRICAN AMERICAN: >60

## 2022-05-03 PROCEDURE — 82565 ASSAY OF CREATININE: CPT

## 2022-05-03 PROCEDURE — 74178 CT ABD&PLV WO CNTR FLWD CNTR: CPT

## 2022-05-03 PROCEDURE — 36415 COLL VENOUS BLD VENIPUNCTURE: CPT

## 2022-05-03 PROCEDURE — 6360000004 HC RX CONTRAST MEDICATION: Performed by: NURSE PRACTITIONER

## 2022-05-03 RX ADMIN — IOPAMIDOL 120 ML: 755 INJECTION, SOLUTION INTRAVENOUS at 10:03

## 2022-06-08 ENCOUNTER — OFFICE VISIT (OUTPATIENT)
Dept: ORTHOPEDIC SURGERY | Age: 62
End: 2022-06-08
Payer: MEDICAID

## 2022-06-08 VITALS — RESPIRATION RATE: 16 BRPM | BODY MASS INDEX: 26.33 KG/M2 | WEIGHT: 158 LBS | HEIGHT: 65 IN

## 2022-06-08 DIAGNOSIS — M65.331 TRIGGER MIDDLE FINGER OF RIGHT HAND: Primary | ICD-10-CM

## 2022-06-08 DIAGNOSIS — M65.341 TRIGGER RING FINGER OF RIGHT HAND: ICD-10-CM

## 2022-06-08 PROCEDURE — G8419 CALC BMI OUT NRM PARAM NOF/U: HCPCS | Performed by: PHYSICIAN ASSISTANT

## 2022-06-08 PROCEDURE — 20550 NJX 1 TENDON SHEATH/LIGAMENT: CPT | Performed by: PHYSICIAN ASSISTANT

## 2022-06-08 PROCEDURE — 3017F COLORECTAL CA SCREEN DOC REV: CPT | Performed by: PHYSICIAN ASSISTANT

## 2022-06-08 PROCEDURE — G8427 DOCREV CUR MEDS BY ELIG CLIN: HCPCS | Performed by: PHYSICIAN ASSISTANT

## 2022-06-08 PROCEDURE — 4004F PT TOBACCO SCREEN RCVD TLK: CPT | Performed by: PHYSICIAN ASSISTANT

## 2022-06-08 PROCEDURE — 99204 OFFICE O/P NEW MOD 45 MIN: CPT | Performed by: PHYSICIAN ASSISTANT

## 2022-06-08 RX ORDER — TRIAMCINOLONE ACETONIDE 40 MG/ML
20 INJECTION, SUSPENSION INTRA-ARTICULAR; INTRAMUSCULAR ONCE
Status: COMPLETED | OUTPATIENT
Start: 2022-06-08 | End: 2022-06-08

## 2022-06-08 RX ADMIN — TRIAMCINOLONE ACETONIDE 20 MG: 40 INJECTION, SUSPENSION INTRA-ARTICULAR; INTRAMUSCULAR at 16:43

## 2022-06-08 NOTE — PROGRESS NOTES
Administrations This Visit     triamcinolone acetonide (KENALOG-40) injection 20 mg     Admin Date  06/08/2022  16:43 Action  Given Dose  20 mg Route  Other Site  Finger (See Comments) Administered By  Radha Richard MA    Ordering Provider: Cecy Pretty PA-C    NDC: [id number]    Lot#: [id number]    : [facility] (PRIMARY C[facility])    Patient Supplied?: No    Comments: Right Ring    Admin Date  06/08/2022  16:43 Action  Given Dose  20 mg Route  Other Site  Finger (See Comments) Administered By  Radha Richard MA    Ordering Provider: SHAZIA Medrano. [address]: [id number]    Lot#: [id number]    : [facility] (PRIMARY C[facility])    Patient Supplied?: No    Comments: Right Middle

## 2022-06-08 NOTE — PROGRESS NOTES
Mr. Christoph Romano is a 62 y.o. right handed man  who is seen today in Hand Surgical Consultation at the request of Rosita Teresa MD.    He presents today regarding right symptoms which have been present for approximately 4 months. A history of antecedent trauma or injury is Absent. He reports symptoms to include mild pain and stiffness with frequent popping, catching or locking of the right Middle Finger and Ring Finger. Finger symptoms are Frequently worse in the morning or overnight. He reports moderate pain located at the base of the symptomatic finger(s). Symptoms are worsening over time. Previous treatment has included conservative measures. He does not claim relation of his symptoms to his required work activities. He has not undergone any form of testing. I have today reviewed with Christoph Romano the clinically relevant, past medical history, medications, allergies,  family history, social history, and Review Of Systems & I have documented any details relevant to today's presenting complaints in my history above. Mr. Marilyn Ferreira's self-reported past medical history, medications, allergies,  family history, social history, and Review Of Systems have been scanned into the chart under the \"Media\" tab. Physical Exam:  Mr. Deb Lee most recent vitals:  Vitals  Resp: 16  Height: 5' 5\" (165.1 cm)  Weight: 158 lb (71.7 kg)    He is well nourished, oriented to person, place & time. He demonstrates appropriate mood and affect as well as normal gait and station. Skin: Normal in appearance, Normal Color and Free of Lesions Bilaterally   Digital range of motion is limited by pain on the Right, normal on the Left. Inducible triggering is noted upon flexion in the right Middle Finger and Ring Finger. There is not an associated flexion contracture of the PIP joint. No other digit demonstrates evidence for stenosing tenosynovitis bilaterally.   Wrist range of motion is without significant limitation bilaterally. Sensation is normal in the Whole Hand bilaterally  Vascular examination reveals normal, good capillary refill and good color bilaterally  Swelling is mild in the symptomatic digit, absent elsewhere bilaterally  There is no evidence of gross joint instability bilaterally. Muscular strength is clinically appropriate bilaterally. Examination for Stenosing Tenosynovitis demonstrates mild tenderness, thickening & nodularity at the A-1 pulley(s) of the Right Middle Finger and Ring Finger. There is a palpable Nota's Node. There is Inducible triggering on active flexion with pain. No other digits demonstrate evidence of Stenosing Tenosynovitis. Examination of the first dorsal extensor compartments of the wrist demonstrates no thickening or fullness. There is no tenderness to palpation over the extensor tendons. Pain is not elicited with resisted thumb extension, and Finklestein's maneuver is negative bilaterally. Impression:  Mr. Isai Dickson has developed Stenosing Tenosynovitis (Trigger Finger), which is currently exacerbated, and presents requesting further treatment. Plan:  I have had a thorough discussion with Mr. Isai Dickson regarding the treatment options available for his initially presenting Right Middle Finger and Ring Finger stenosing tenosynovitis, which is causing him functional limitations. I have outlined for Mr. Isai Dickson the the various treatment modalities available to him, including the options and utility of the  judicious use of over-the-counter anti-inflammatory medications (if allowed by his primary care physician), the temporary relief afforded by injection of corticosteroids, and the more definitive option of surgical treatment for this problem if he feels that the duration or severity of his symptoms merits surgical intervention.  I have explained  the reasonable expectations for the long term success of each option and the likelihood that further more aggressive treatment could be required for his current presenting condition. Based upon our current discussion and a reasonable understating of the options available to him, Mr. Nazanin Soto has selected to proceed with  an injection to the right Middle Finger and Ring Finger Flexor Tendon Sheath. I have outlined for him the nature of the injection, and the pre, britta and post injection considerations and the appropriate expectations for this injection. I have clearly explained to him that the above outlined treatment plan should not be expected to 'cure' his stenosing tenosynovitis, but we are rather treating the symptoms with which he presents. He has understood that in order to achieve long lasting relief of his symptoms and to prevent future worsening or further damage, that definitive surgical treatment would be required. Mr. Nazanin Soto  voiced an appropriate understanding of our discussion, the options available to him, and of the expectations of his selected  treatment. He did wish to proceed with Right Middle Finger and Ring Finger Flexor Tendon Sheath injection. Procedure:  right Middle Finger Trigger Finger Injection  [first Injection]: After full discussion of the nature of this process and outlining a treatment plan with Mr. Nazanin Soto, we discussed the complications, limitations, expectations, alternatives, and risks of injection of the flexor tendon sheath. I have explained the potential for bleeding, infection, potential side effects of the medication, and the remote possibility of damage to surrounding structures as result of the injection. He understood this information well and verbally consented to this treatment.     The skin of the symptomatic digit was prepped with Isopropyl Alcohol and under aseptic conditions the flexor tendon sheath was injected with a combination of 1/2 ml of 0.25% Bupivacaine without Epinephrine and 20 mg of Triamcinolone (40 mg/ml). Good filling of the flexor sheath was noted. A dry sterile bandage was applied and the patient tolerated the injection without difficulty. I advised the patient of the expected response, possible reactions and the instructions for care of the hand. He is instructed in the judicious use of over-the-counter anti-inflammatory medications or pain relievers for his symptoms if allowed by his primary care physician. Procedure:  right Ring Finger Trigger Finger Injection  [first Injection]: After full discussion of the nature of this process and outlining a treatment plan with Mr. Darlene Evans, we discussed the complications, limitations, expectations, alternatives, and risks of injection of the flexor tendon sheath. I have explained the potential for bleeding, infection, potential side effects of the medication, and the remote possibility of damage to surrounding structures as result of the injection. He understood this information well and verbally consented to this treatment. The skin of the symptomatic digit was prepped with Isopropyl Alcohol and under aseptic conditions the flexor tendon sheath was injected with a combination of 1/2 ml of 0.25% Bupivacaine without Epinephrine and 20 mg of Triamcinolone (40 mg/ml). Good filling of the flexor sheath was noted. A dry sterile bandage was applied and the patient tolerated the injection without difficulty. I advised the patient of the expected response, possible reactions and the instructions for care of the hand. He is instructed in the judicious use of over-the-counter anti-inflammatory medications or pain relievers for his symptoms if allowed by his primary care physician. I have also discussed with Mr. Darlene Evans the other treatment options available to him for this condition. We have today selected to proceed with treatment by injection with steroid medication.   He and I have agreed that if our current course of Injection treatment does not prove to be effective over the short term future, that he will schedule a follow-up appointment to discuss and select an alternate course of therapy including possibly further conservative treatment or surgical treatment. Mr. Ozzie Ortiz has been given a full verbal list of instructions and precautions related to his present condition. I have asked him to followup with me in the office at the prescribed time. He is also specifically requested to call or return to the office sooner if his symptoms change or worsen prior to the next scheduled appointment.

## 2022-09-21 ENCOUNTER — NURSE TRIAGE (OUTPATIENT)
Dept: OTHER | Facility: CLINIC | Age: 62
End: 2022-09-21

## 2022-09-21 NOTE — TELEPHONE ENCOUNTER
Received call from [facility] with Red Flag Complaint. Subjective: Caller states \"left shoulder pain\"     Current Symptoms: see above, denies other symptoms, just when he moves it is when it is painful, denies injury, denies swelling, redness    Onset: 10 days ago; unchanged, feels it more in the morning    Associated Symptoms: reduced activity    Pain Severity: 7/10; sharp; just with certain movements    Temperature: n/a     What has been tried: nothing    LMP: NA Pregnant: NA    Recommended disposition: See in Office Today or Tomorrow    Care advice provided, patient verbalizes understanding; denies any other questions or concerns; instructed to call back for any new or worsening symptoms. Patient/Caller agrees with recommended disposition; writer provided warm transfer to [facility] for appointment scheduling     Attention Provider: Thank you for allowing me to participate in the care of your patient. The patient was connected to triage in response to information provided to the ECC/PSC. Please do not respond through this encounter as the response is not directed to a shared pool.        Reason for Disposition   Patient wants to be seen    Protocols used: Shoulder Pain-ADULT-[place]

## 2022-09-22 ENCOUNTER — OFFICE VISIT (OUTPATIENT)
Dept: FAMILY MEDICINE CLINIC | Age: 62
End: 2022-09-22
Payer: MEDICAID

## 2022-09-22 VITALS
HEART RATE: 76 BPM | WEIGHT: 150.2 LBS | HEIGHT: 65 IN | DIASTOLIC BLOOD PRESSURE: 68 MMHG | BODY MASS INDEX: 25.02 KG/M2 | SYSTOLIC BLOOD PRESSURE: 130 MMHG | OXYGEN SATURATION: 98 %

## 2022-09-22 DIAGNOSIS — M25.512 ACUTE PAIN OF LEFT SHOULDER: ICD-10-CM

## 2022-09-22 DIAGNOSIS — R21 RASH: Primary | ICD-10-CM

## 2022-09-22 PROCEDURE — 4004F PT TOBACCO SCREEN RCVD TLK: CPT | Performed by: FAMILY MEDICINE

## 2022-09-22 PROCEDURE — 99214 OFFICE O/P EST MOD 30 MIN: CPT | Performed by: FAMILY MEDICINE

## 2022-09-22 PROCEDURE — G8427 DOCREV CUR MEDS BY ELIG CLIN: HCPCS | Performed by: FAMILY MEDICINE

## 2022-09-22 PROCEDURE — G8420 CALC BMI NORM PARAMETERS: HCPCS | Performed by: FAMILY MEDICINE

## 2022-09-22 PROCEDURE — 3017F COLORECTAL CA SCREEN DOC REV: CPT | Performed by: FAMILY MEDICINE

## 2022-09-22 RX ORDER — NAPROXEN 500 MG/1
500 TABLET ORAL 2 TIMES DAILY WITH MEALS
Qty: 42 TABLET | Refills: 0 | Status: SHIPPED | OUTPATIENT
Start: 2022-09-22 | End: 2022-10-13

## 2022-09-22 RX ORDER — CLOBETASOL PROPIONATE 0.5 MG/G
CREAM TOPICAL
Qty: 60 G | Refills: 0 | Status: SHIPPED | OUTPATIENT
Start: 2022-09-22

## 2022-09-22 ASSESSMENT — PATIENT HEALTH QUESTIONNAIRE - PHQ9
SUM OF ALL RESPONSES TO PHQ9 QUESTIONS 1 & 2: 0
1. LITTLE INTEREST OR PLEASURE IN DOING THINGS: 0
SUM OF ALL RESPONSES TO PHQ QUESTIONS 1-9: 0
2. FEELING DOWN, DEPRESSED OR HOPELESS: 0

## 2022-09-22 NOTE — PROGRESS NOTES
[address] Note    Date: 9/22/2022                                               Kay Avila:     Chief Complaint   Patient presents with    Shoulder Pain     Pt stated left shoulder hurts when he moves and he is unable to lift it. HPI  L shoulder pain starting 10 days ago. 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 Located on top of the shoulder laterally. Hurts when raising arm, no pain at rest. Getting worse gradually. No pain in arm. Patient reports intermittent rash on hands and feet. Works a lot with his hands and stands a lot at work. For little pustules that sometimes like pus. We will try cortisone cream, which usually resolves it over the course of a week. Denies significant pain.  + Itchiness. Patient Active Problem List    Diagnosis Date Noted    Crohn's disease ([facility] 05/03/2022       Past Medical History:   Diagnosis Date    Crohn's disease ([facility]        Current Outpatient Medications   Medication Sig Dispense Refill    clobetasol (TEMOVATE) 0.05 % cream Apply topically 2 times daily. 60 g 0    naproxen (NAPROSYN) 500 MG tablet Take 1 tablet by mouth 2 times daily (with meals) for 21 days 42 tablet 0    amoxicillin-clavulanate (AUGMENTIN) 875-125 MG per tablet TAKE 1 TABLET BY MOUTH EVERY TWELVE HOURS UNTIL GONE      buprenorphine-naloxone (SUBOXONE) 8-2 MG SUBL SL tablet        No current facility-administered medications for this visit. No Known Allergies    Review of Systems  +tingling in L arm at times, no fever, no cough    Vitals:  /68   Pulse 76   Ht 5' 5\" (1.651 m)   Wt 150 lb 3.2 oz (68.1 kg)   SpO2 98%   BMI 24.99 kg/m²     Wt Readings from Last 3 Encounters:   09/22/22 150 lb 3.2 oz (68.1 kg)   06/08/22 158 lb (71.7 kg)   04/19/22 158 lb (71.7 kg)        Physical Exam  General:  Well-appearing, NAD, alert, non-toxic  HEENT:  Normocephalic, atraumatic.    CHEST/LUNGS: No dyspnea  EXTREMETIES: Full range of motion actively with left arm abduction with some tenderness over the deltoid. Negative impingement test.  Negative empty can test.  Negative Marquis test.  No tenderness to palpation over the acromion process. SKIN: + Minor scale over bilateral hands. No erythema.  + Scattered pustules. PSYCH:  A+O x 3; normal affect  NEURO:  GCS 15, CN2-12 grossly intact, no focal motor/sensory deficits, normal gait, normal speech      Assessment/Plan     69-year-old male with left shoulder pain. Likely due to bursitis of the deltoid. Recommend rest, ice, Naprosyn. Follow-up if symptoms do not resolve in 1 week. Patient has intermittent rash in the hands and the feet. This unclear etiology. Will treat with clobetasol refer to dermatology for further evaluation and treatment. 1. Rash  -     LILIAM Arora MD, Dermatology, [facility]  -     clobetasol (TEMOVATE) 0.05 % cream; Apply topically 2 times daily. , Disp-60 g, R-0, Normal  2. Acute pain of left shoulder  -     naproxen (NAPROSYN) 500 MG tablet; Take 1 tablet by mouth 2 times daily (with meals) for 21 days, Disp-42 tablet, R-0Normal     Orders Placed This Encounter   Procedures    LILIAM Arora MD, Dermatology, [facility]     Referral Priority:   Routine     Referral Type:   Eval and Treat     Referral Reason:   Specialty Services Required     Referred to Provider:   Wilber Correia     Requested Specialty:   Dermatology     Number of Visits Requested:   1       No follow-ups on file.     Trell Roberts MD, MD    9/22/2022  11:29 AM

## 2023-03-07 ENCOUNTER — OFFICE VISIT (OUTPATIENT)
Dept: FAMILY MEDICINE CLINIC | Age: 63
End: 2023-03-07

## 2023-03-07 VITALS
DIASTOLIC BLOOD PRESSURE: 76 MMHG | OXYGEN SATURATION: 99 % | HEART RATE: 78 BPM | WEIGHT: 154.2 LBS | BODY MASS INDEX: 25.66 KG/M2 | SYSTOLIC BLOOD PRESSURE: 128 MMHG

## 2023-03-07 DIAGNOSIS — M54.41 CHRONIC RIGHT-SIDED LOW BACK PAIN WITH RIGHT-SIDED SCIATICA: ICD-10-CM

## 2023-03-07 DIAGNOSIS — Z87.891 HISTORY OF TOBACCO USE: ICD-10-CM

## 2023-03-07 DIAGNOSIS — G89.29 CHRONIC RIGHT-SIDED LOW BACK PAIN WITH RIGHT-SIDED SCIATICA: ICD-10-CM

## 2023-03-07 DIAGNOSIS — Z00.00 ANNUAL PHYSICAL EXAM: ICD-10-CM

## 2023-03-07 DIAGNOSIS — E53.8 VITAMIN B12 DEFICIENCY: Primary | ICD-10-CM

## 2023-03-07 RX ORDER — PREDNISONE 10 MG/1
TABLET ORAL
Qty: 30 TABLET | Refills: 0 | Status: SHIPPED | OUTPATIENT
Start: 2023-03-07

## 2023-03-07 RX ORDER — CYANOCOBALAMIN 1000 UG/ML
1000 INJECTION, SOLUTION INTRAMUSCULAR; SUBCUTANEOUS ONCE
Status: COMPLETED | OUTPATIENT
Start: 2023-03-07 | End: 2023-03-07

## 2023-03-07 RX ORDER — IBUPROFEN 800 MG/1
800 TABLET ORAL
Qty: 90 TABLET | Refills: 0 | Status: SHIPPED | OUTPATIENT
Start: 2023-03-07

## 2023-03-07 RX ADMIN — CYANOCOBALAMIN 1000 MCG: 1000 INJECTION, SOLUTION INTRAMUSCULAR; SUBCUTANEOUS at 14:47

## 2023-03-07 SDOH — ECONOMIC STABILITY: INCOME INSECURITY: HOW HARD IS IT FOR YOU TO PAY FOR THE VERY BASICS LIKE FOOD, HOUSING, MEDICAL CARE, AND HEATING?: NOT HARD AT ALL

## 2023-03-07 SDOH — ECONOMIC STABILITY: HOUSING INSECURITY
IN THE LAST 12 MONTHS, WAS THERE A TIME WHEN YOU DID NOT HAVE A STEADY PLACE TO SLEEP OR SLEPT IN A SHELTER (INCLUDING NOW)?: NO

## 2023-03-07 SDOH — ECONOMIC STABILITY: FOOD INSECURITY: WITHIN THE PAST 12 MONTHS, YOU WORRIED THAT YOUR FOOD WOULD RUN OUT BEFORE YOU GOT MONEY TO BUY MORE.: NEVER TRUE

## 2023-03-07 SDOH — ECONOMIC STABILITY: FOOD INSECURITY: WITHIN THE PAST 12 MONTHS, THE FOOD YOU BOUGHT JUST DIDN'T LAST AND YOU DIDN'T HAVE MONEY TO GET MORE.: NEVER TRUE

## 2023-03-07 ASSESSMENT — PATIENT HEALTH QUESTIONNAIRE - PHQ9
2. FEELING DOWN, DEPRESSED OR HOPELESS: 0
SUM OF ALL RESPONSES TO PHQ QUESTIONS 1-9: 0
1. LITTLE INTEREST OR PLEASURE IN DOING THINGS: 0
SUM OF ALL RESPONSES TO PHQ9 QUESTIONS 1 & 2: 0

## 2023-03-07 NOTE — PROGRESS NOTES
[address] Note    Date: 3/7/2023                                               Matilda Weeks:     Chief Complaint   Patient presents with    Back Pain    Neck Pain       HPI  Pt is here for annual exam. Last tdap 2020. Last C-scope 3 years ago, was told to f/u in 5 years. Pt smokes about 1 PPD. Used to smoke 1.5 PPD. BL low back pain that he has had on and off for many years. Goes down R leg sometimes. Last time he had this pain was last year. Has responded in the past with prednisone and traction with a chiropractor. Would like a refill of prednisone. Will be seeing the chiropractor soon. Patient Active Problem List    Diagnosis Date Noted    Crohn's disease ([facility] 05/03/2022       Past Medical History:   Diagnosis Date    Crohn's disease ([facility]        Current Outpatient Medications   Medication Sig Dispense Refill    predniSONE (DELTASONE) 10 MG tablet Take 4 tablets on days 1-3, 3 tab on days 4-6, 2 tab on days 7-9, and 1 tab on days 10-12 30 tablet 0    ibuprofen (ADVIL;MOTRIN) 800 MG tablet Take 1 tablet by mouth 3 times daily (with meals) 90 tablet 0    clobetasol (TEMOVATE) 0.05 % cream Apply topically 2 times daily. 60 g 0    naproxen (NAPROSYN) 500 MG tablet Take 1 tablet by mouth 2 times daily (with meals) for 21 days 42 tablet 0    buprenorphine-naloxone (SUBOXONE) 8-2 MG SUBL SL tablet        No current facility-administered medications for this visit. No Known Allergies    Review of Systems  No CP, no SOB, no rash, no bruise, no HA, no vision change, no ankle swelling, no hearing problems, no LAD      Vitals:  /76   Pulse 78   Wt 154 lb 3.2 oz (69.9 kg)   SpO2 99%   BMI 25.66 kg/m²     Wt Readings from Last 3 Encounters:   03/07/23 154 lb 3.2 oz (69.9 kg)   09/22/22 150 lb 3.2 oz (68.1 kg)   06/08/22 158 lb (71.7 kg)        Physical Exam  General:  Well-appearing, NAD, alert, non-toxic  HEENT:  Normocephalic, atraumatic. Pupils equal and round.  TMs pearly with good landmarks. Moist mucous membranes. Normal dentition  NECK:  Supple, normal range of motion, no LAD, no meningeal signs, no JVD, nontender  CHEST/LUNGS: CTAB, no crackles, no wheeze, no rhonchi. Symmetric rise  CARDIOVASCULAR: RRR,  no murmur, no rub  ABDOMEN: Soft, non-tender, non-distended. No masses  EXTREMETIES: Normal movement of all extremities. No edema. No joint swelling. SKIN:  No rash, no cellulitis, no bruising, no petechiae/purpura/vesicles/pustules/abscess  PSYCH:  A+O x 3; normal affect  NEURO:  GCS 15, CN2-12 grossly intact, no focal motor/sensory deficits, no cerebellar deficits, normal gait, normal speech      Assessment/Plan     71-year-old male here for annual exam.  Tdap is up-to-date. Update Shingrix and Prevnar today. Check routine labs. Patient has history of tobacco use, will check CT scan of the lung. Patient has history of B12 deficiency. We will give B12 injections today. We will recheck levels at next visit. Patient has acute on chronic right-sided low back pain with right-sided sciatica. Treatment prednisone taper. Recommend follow-up with chiropractor for traction, as this is worked well in the past.    Discussed with patient medication/s dosage, instructions, potential S/E, A/R and Drug Interaction  Tylenol or Motrin as needed for pain or fever  Advise to return here if worse or go to nearest ER  Encourage fluids  Pt discharged in stable condition at 14:35      1. Vitamin B12 deficiency  -     cyanocobalamin injection 1,000 mcg; 1,000 mcg, IntraMUSCular, ONCE, 1 dose, On Tue 3/7/23 at 1445  2. Annual physical exam  -     CBC with Auto Differential; Future  -     Comprehensive Metabolic Panel; Future  -     Hemoglobin A1C; Future  -     Lipid, Fasting; Future  -     Pneumococcal, PCV20, PREVNAR 20, (age 25 yrs+), IM, PF  -     Zoster, SHINGRIX, (18 yrs +), IM  3.  Chronic right-sided low back pain with right-sided sciatica  -     predniSONE (DELTASONE) 10 MG tablet; Take 4 tablets on days 1-3, 3 tab on days 4-6, 2 tab on days 7-9, and 1 tab on days 10-12, Disp-30 tablet, R-0Normal  -     ibuprofen (ADVIL;MOTRIN) 800 MG tablet; Take 1 tablet by mouth 3 times daily (with meals), Disp-90 tablet, R-0Normal  4. History of tobacco use  -     CT Lung Screen (Initial or Annual); Future       Orders Placed This Encounter   Procedures    CT Lung Screen (Initial or Annual)     Age: 65 y.o. Smoking History:   Social History    Tobacco Use      Smoking status: Every Day        Packs/day: 1.50        Years: 20.00        Pack years: 30        Types: Cigarettes      Smokeless tobacco: Never    Alcohol use: Yes      Comment: about once a month    Drug use: No      Comment: former     Pack years: 30  No previous lung cancer screening exam     Standing Status:   Future     Standing Expiration Date:   9/7/2024     Order Specific Question:   Is there documentation of shared decision making? Answer:   Yes     Order Specific Question:   Does the patient show any signs or symptoms of lung cancer? Answer:   No     Order Specific Question:   Is this the first (baseline) CT or an annual exam?     Answer:   Baseline [1]     Order Specific Question:   Is this a low dose CT or a routine CT? Answer:   Low Dose CT [1]     Order Specific Question:   Smoking Status? Answer:   Every Day [1]     Order Specific Question:   Smoking packs per day? Answer:   1     Order Specific Question:   Years smoking?      Answer:   30    Pneumococcal, PCV20, PREVNAR 21, (age 25 yrs+), IM, PF    Zoster, SHINGRIX, (18 yrs +), IM    CBC with Auto Differential     Standing Status:   Future     Standing Expiration Date:   3/7/2024    Comprehensive Metabolic Panel     Standing Status:   Future     Standing Expiration Date:   3/7/2024    Hemoglobin A1C     Standing Status:   Future     Standing Expiration Date:   3/7/2024    Lipid, Fasting     Standing Status:   Future     Standing Expiration Date: 3/7/2024       Return in about 6 months (around 9/7/2023).     Dangelo Delgado MD, MD    3/7/2023  3:07 PM

## 2023-03-16 ENCOUNTER — TELEPHONE (OUTPATIENT)
Dept: FAMILY MEDICINE CLINIC | Age: 63
End: 2023-03-16

## 2023-03-16 DIAGNOSIS — G89.29 CHRONIC RIGHT-SIDED LOW BACK PAIN WITH RIGHT-SIDED SCIATICA: Primary | ICD-10-CM

## 2023-03-16 DIAGNOSIS — M54.41 CHRONIC RIGHT-SIDED LOW BACK PAIN WITH RIGHT-SIDED SCIATICA: Primary | ICD-10-CM

## 2023-03-16 RX ORDER — PREDNISONE 20 MG/1
40 TABLET ORAL DAILY
Qty: 10 TABLET | Refills: 0 | Status: SHIPPED | OUTPATIENT
Start: 2023-03-16 | End: 2023-03-21

## 2023-03-16 NOTE — TELEPHONE ENCOUNTER
predniSONE (DELTASONE) 10 MG tablet     Pt is still experiencing a lot of back pain. He is asking if he could get 40 MG of prednisone sent to his pharmacy? Pt said last year when he had the back pain he started with 40 MG.     [facility]

## 2023-03-21 DIAGNOSIS — G89.29 CHRONIC RIGHT-SIDED LOW BACK PAIN WITH RIGHT-SIDED SCIATICA: ICD-10-CM

## 2023-03-21 DIAGNOSIS — M54.41 CHRONIC RIGHT-SIDED LOW BACK PAIN WITH RIGHT-SIDED SCIATICA: ICD-10-CM

## 2023-03-22 RX ORDER — PREDNISONE 10 MG/1
TABLET ORAL
Qty: 30 TABLET | Refills: 0 | OUTPATIENT
Start: 2023-03-22

## 2023-03-28 ENCOUNTER — OFFICE VISIT (OUTPATIENT)
Dept: FAMILY MEDICINE CLINIC | Age: 63
End: 2023-03-28
Payer: COMMERCIAL

## 2023-03-28 VITALS
OXYGEN SATURATION: 100 % | BODY MASS INDEX: 24.99 KG/M2 | DIASTOLIC BLOOD PRESSURE: 86 MMHG | SYSTOLIC BLOOD PRESSURE: 135 MMHG | HEART RATE: 77 BPM | WEIGHT: 150 LBS | HEIGHT: 65 IN | RESPIRATION RATE: 16 BRPM

## 2023-03-28 DIAGNOSIS — R53.83 FATIGUE, UNSPECIFIED TYPE: ICD-10-CM

## 2023-03-28 DIAGNOSIS — E53.8 VITAMIN B12 DEFICIENCY: ICD-10-CM

## 2023-03-28 DIAGNOSIS — G89.29 CHRONIC RIGHT-SIDED LOW BACK PAIN WITH RIGHT-SIDED SCIATICA: Primary | ICD-10-CM

## 2023-03-28 DIAGNOSIS — M54.41 CHRONIC RIGHT-SIDED LOW BACK PAIN WITH RIGHT-SIDED SCIATICA: Primary | ICD-10-CM

## 2023-03-28 PROCEDURE — 99214 OFFICE O/P EST MOD 30 MIN: CPT | Performed by: FAMILY MEDICINE

## 2023-03-28 PROCEDURE — 3017F COLORECTAL CA SCREEN DOC REV: CPT | Performed by: FAMILY MEDICINE

## 2023-03-28 PROCEDURE — 4004F PT TOBACCO SCREEN RCVD TLK: CPT | Performed by: FAMILY MEDICINE

## 2023-03-28 PROCEDURE — 96372 THER/PROPH/DIAG INJ SC/IM: CPT | Performed by: FAMILY MEDICINE

## 2023-03-28 PROCEDURE — G8420 CALC BMI NORM PARAMETERS: HCPCS | Performed by: FAMILY MEDICINE

## 2023-03-28 PROCEDURE — G8427 DOCREV CUR MEDS BY ELIG CLIN: HCPCS | Performed by: FAMILY MEDICINE

## 2023-03-28 PROCEDURE — G8484 FLU IMMUNIZE NO ADMIN: HCPCS | Performed by: FAMILY MEDICINE

## 2023-03-28 RX ORDER — PREDNISONE 10 MG/1
TABLET ORAL
Qty: 30 TABLET | Refills: 0 | Status: SHIPPED | OUTPATIENT
Start: 2023-03-29

## 2023-03-28 RX ORDER — CYCLOBENZAPRINE HCL 10 MG
10 TABLET ORAL 3 TIMES DAILY PRN
Qty: 15 TABLET | Refills: 0 | Status: SHIPPED | OUTPATIENT
Start: 2023-03-28

## 2023-03-28 RX ORDER — CYANOCOBALAMIN 1000 UG/ML
1000 INJECTION, SOLUTION INTRAMUSCULAR; SUBCUTANEOUS ONCE
Status: COMPLETED | OUTPATIENT
Start: 2023-03-28 | End: 2023-03-28

## 2023-03-28 RX ADMIN — CYANOCOBALAMIN 1000 MCG: 1000 INJECTION, SOLUTION INTRAMUSCULAR; SUBCUTANEOUS at 10:57

## 2023-03-31 NOTE — TELEPHONE ENCOUNTER
Left VM to call office.
Medication and Quantity requested: predniSONE (DELTASONE) 10 MG tablet         Last Visit  3/15/2022    Pharmacy and phone number updated in [facility]:  Yes      [address]
Medication:   Requested Prescriptions     Pending Prescriptions Disp Refills    predniSONE (DELTASONE) 10 MG tablet 36 tablet 0     Sig: Take 6 tablets by mouth daily for 3 days, THEN 4 tablets daily for 3 days, THEN 2 tablets daily for 3 days. Last Filled:  3/10/2022, 36, 0    Patient Phone Number: [phone] (home)     Last appt: 3/15/2022   Next appt: Visit date not found    Last OARRS: No flowsheet data found.
Pt followed up on medication refill. Please advise. Tomy Bennett
Pt notified.
Should not do another prednisone taper so soon after the previous 1.   Prefer to have patient contact Dr. Leola Rdz on Monday if rash persists
The patient is a 89y Female complaining of syncope.

## 2023-04-18 ENCOUNTER — HOSPITAL ENCOUNTER (OUTPATIENT)
Age: 63
Setting detail: SPECIMEN
Discharge: HOME | End: 2023-04-18

## 2023-04-18 ENCOUNTER — HOSPITAL ENCOUNTER (OUTPATIENT)
Dept: CT IMAGING | Age: 63
Discharge: HOME | End: 2023-04-18
Payer: COMMERCIAL

## 2023-04-18 DIAGNOSIS — Z87.891 HISTORY OF TOBACCO USE: ICD-10-CM

## 2023-04-18 LAB
ALBUMIN SERPL-MCNC: 3.8 G/DL (ref 3.4–5)
ALBUMIN/GLOB SERPL: 1.1 {RATIO} (ref 1.1–2.2)
ALP SERPL-CCNC: 70 U/L (ref 40–129)
ALT SERPL-CCNC: 6 U/L (ref 10–40)
ANION GAP SERPL CALCULATED.3IONS-SCNC: 9 MMOL/L (ref 3–16)
AST SERPL-CCNC: 11 U/L (ref 15–37)
BASOPHILS # BLD: 0 K/UL (ref 0–0.2)
BASOPHILS NFR BLD: 0.6 %
BILIRUB SERPL-MCNC: <0.2 MG/DL (ref 0–1)
BUN SERPL-MCNC: 9 MG/DL (ref 7–20)
CALCIUM SERPL-MCNC: 9.8 MG/DL (ref 8.3–10.6)
CHLORIDE SERPL-SCNC: 106 MMOL/L (ref 99–110)
CO2 SERPL-SCNC: 24 MMOL/L (ref 21–32)
CREAT SERPL-MCNC: 0.9 MG/DL (ref 0.8–1.3)
DEPRECATED RDW RBC AUTO: 14.9 % (ref 12.4–15.4)
EOSINOPHIL # BLD: 0.1 K/UL (ref 0–0.6)
EOSINOPHIL NFR BLD: 1.4 %
GFR SERPLBLD CREATININE-BSD FMLA CKD-EPI: >60 ML/MIN/{1.73_M2}
GLUCOSE SERPL-MCNC: 136 MG/DL (ref 70–99)
HCT VFR BLD AUTO: 41.8 % (ref 40.5–52.5)
HGB BLD-MCNC: 14.2 G/DL (ref 13.5–17.5)
LYMPHOCYTES # BLD: 1.4 K/UL (ref 1–5.1)
LYMPHOCYTES NFR BLD: 25.5 %
MCH RBC QN AUTO: 29.1 PG (ref 26–34)
MCHC RBC AUTO-ENTMCNC: 33.9 G/DL (ref 31–36)
MCV RBC AUTO: 85.8 FL (ref 80–100)
MONOCYTES # BLD: 0.4 K/UL (ref 0–1.3)
MONOCYTES NFR BLD: 6.8 %
NEUTROPHILS # BLD: 3.5 K/UL (ref 1.7–7.7)
NEUTROPHILS NFR BLD: 65.7 %
PLATELET # BLD AUTO: 290 K/UL (ref 135–450)
PMV BLD AUTO: 7.3 FL (ref 5–10.5)
POTASSIUM SERPL-SCNC: 4.1 MMOL/L (ref 3.5–5.1)
PROT SERPL-MCNC: 7.4 G/DL (ref 6.4–8.2)
RBC # BLD AUTO: 4.87 M/UL (ref 4.2–5.9)
SODIUM SERPL-SCNC: 139 MMOL/L (ref 136–145)
WBC # BLD AUTO: 5.3 K/UL (ref 4–11)

## 2023-04-18 PROCEDURE — 80053 COMPREHEN METABOLIC PANEL: CPT

## 2023-04-18 PROCEDURE — 85025 COMPLETE CBC W/AUTO DIFF WBC: CPT

## 2023-04-18 PROCEDURE — 83036 HEMOGLOBIN GLYCOSYLATED A1C: CPT

## 2023-04-18 PROCEDURE — 36415 COLL VENOUS BLD VENIPUNCTURE: CPT

## 2023-04-18 PROCEDURE — 80061 LIPID PANEL: CPT

## 2023-04-18 PROCEDURE — 71271 CT THORAX LUNG CANCER SCR C-: CPT

## 2023-04-19 LAB
CHOLEST SERPL-MCNC: 140 MG/DL (ref 0–199)
EST. AVERAGE GLUCOSE BLD GHB EST-MCNC: 119.8 MG/DL
HBA1C MFR BLD: 5.8 %
HDLC SERPL-MCNC: 46 MG/DL (ref 40–60)
LDL CHOLESTEROL CALCULATED: 68 MG/DL
TRIGL SERPL-MCNC: 128 MG/DL (ref 0–150)
VLDLC SERPL CALC-MCNC: 26 MG/DL

## 2023-04-25 ENCOUNTER — TELEPHONE (OUTPATIENT)
Dept: CASE MANAGEMENT | Age: 63
End: 2023-04-25

## 2023-04-25 NOTE — TELEPHONE ENCOUNTER
Baseline lung screen on 4/18/23. LRAD2. Recommended screen in one year. Smoking cessation material mailed. Results letter mailed.  Will follow in the lung screening program.

## 2023-05-02 DIAGNOSIS — I77.810 ASCENDING AORTA DILATATION (HCC): ICD-10-CM

## 2023-05-02 DIAGNOSIS — Z87.891 HISTORY OF TOBACCO USE: Primary | ICD-10-CM

## 2023-06-26 ENCOUNTER — TELEPHONE (OUTPATIENT)
Dept: ORTHOPEDIC SURGERY | Age: 63
End: 2023-06-26

## 2023-06-30 ENCOUNTER — OFFICE VISIT (OUTPATIENT)
Dept: ORTHOPEDIC SURGERY | Age: 63
End: 2023-06-30

## 2023-06-30 VITALS — WEIGHT: 150 LBS | RESPIRATION RATE: 16 BRPM | BODY MASS INDEX: 24.99 KG/M2 | HEIGHT: 65 IN

## 2023-06-30 DIAGNOSIS — M65.331 TRIGGER MIDDLE FINGER OF RIGHT HAND: Primary | ICD-10-CM

## 2023-06-30 DIAGNOSIS — M65.341 TRIGGER RING FINGER OF RIGHT HAND: ICD-10-CM

## 2023-06-30 RX ORDER — TRIAMCINOLONE ACETONIDE 40 MG/ML
20 INJECTION, SUSPENSION INTRA-ARTICULAR; INTRAMUSCULAR ONCE
Status: COMPLETED | OUTPATIENT
Start: 2023-06-30 | End: 2023-06-30

## 2023-06-30 RX ADMIN — TRIAMCINOLONE ACETONIDE 20 MG: 40 INJECTION, SUSPENSION INTRA-ARTICULAR; INTRAMUSCULAR at 14:28

## 2023-06-30 RX ADMIN — TRIAMCINOLONE ACETONIDE 20 MG: 40 INJECTION, SUSPENSION INTRA-ARTICULAR; INTRAMUSCULAR at 14:29

## 2023-07-21 ENCOUNTER — TELEMEDICINE (OUTPATIENT)
Dept: FAMILY MEDICINE CLINIC | Age: 63
End: 2023-07-21
Payer: COMMERCIAL

## 2023-07-21 DIAGNOSIS — G89.29 CHRONIC RIGHT-SIDED LOW BACK PAIN WITH RIGHT-SIDED SCIATICA: ICD-10-CM

## 2023-07-21 DIAGNOSIS — M54.41 CHRONIC RIGHT-SIDED LOW BACK PAIN WITH RIGHT-SIDED SCIATICA: ICD-10-CM

## 2023-07-21 PROCEDURE — G8427 DOCREV CUR MEDS BY ELIG CLIN: HCPCS | Performed by: FAMILY MEDICINE

## 2023-07-21 PROCEDURE — 3017F COLORECTAL CA SCREEN DOC REV: CPT | Performed by: FAMILY MEDICINE

## 2023-07-21 PROCEDURE — 99213 OFFICE O/P EST LOW 20 MIN: CPT | Performed by: FAMILY MEDICINE

## 2023-07-21 PROCEDURE — 4004F PT TOBACCO SCREEN RCVD TLK: CPT | Performed by: FAMILY MEDICINE

## 2023-07-21 PROCEDURE — G8420 CALC BMI NORM PARAMETERS: HCPCS | Performed by: FAMILY MEDICINE

## 2023-07-21 RX ORDER — PREDNISONE 10 MG/1
TABLET ORAL
Qty: 30 TABLET | Refills: 0 | Status: SHIPPED | OUTPATIENT
Start: 2023-07-21

## 2023-07-21 NOTE — PROGRESS NOTES
for pain or fever  Advise to return here if worse or go to nearest ER  Encourage fluids  Pt discharged in stable condition at 14:58      1. Chronic right-sided low back pain with right-sided sciatica  The following orders have not been finalized:  -     predniSONE (DELTASONE) 10 MG tablet       No orders of the defined types were placed in this encounter. No follow-ups on file.     Trinh Vargas MD, MD    7/21/2023  2:55 PM

## 2023-08-10 DIAGNOSIS — M54.41 CHRONIC RIGHT-SIDED LOW BACK PAIN WITH RIGHT-SIDED SCIATICA: ICD-10-CM

## 2023-08-10 DIAGNOSIS — G89.29 CHRONIC RIGHT-SIDED LOW BACK PAIN WITH RIGHT-SIDED SCIATICA: ICD-10-CM

## 2023-08-10 RX ORDER — PREDNISONE 10 MG/1
TABLET ORAL
Qty: 30 TABLET | Refills: 0 | OUTPATIENT
Start: 2023-08-10

## 2023-08-10 NOTE — TELEPHONE ENCOUNTER
Medication:   Requested Prescriptions     Pending Prescriptions Disp Refills    predniSONE (DELTASONE) 10 MG tablet 30 tablet 0     Sig: Take 4 tablets on days 1-3, 3 tab on days 4-6, 2 tab on days 7-9, and 1 tab on days 10-12        Last Filled:      Patient Phone Number: [phone] (home)     Last appt: 7/21/2023   Next appt: Visit date not found    Last OARRS: No flowsheet data found.

## 2023-08-10 NOTE — TELEPHONE ENCOUNTER
Medication and Quantity requested: predniSONE (DELTASONE) 10 MG tablet       Last Visit  7/21/2023    Pharmacy and phone number updated in [facility]:  yes

## 2023-08-11 RX ORDER — PREDNISONE 10 MG/1
TABLET ORAL
Qty: 30 TABLET | Refills: 0 | Status: SHIPPED | OUTPATIENT
Start: 2023-08-11

## 2023-11-13 DIAGNOSIS — G89.29 CHRONIC RIGHT-SIDED LOW BACK PAIN WITH RIGHT-SIDED SCIATICA: ICD-10-CM

## 2023-11-13 DIAGNOSIS — M54.41 CHRONIC RIGHT-SIDED LOW BACK PAIN WITH RIGHT-SIDED SCIATICA: ICD-10-CM

## 2023-11-14 RX ORDER — PREDNISONE 10 MG/1
TABLET ORAL
Qty: 30 TABLET | Refills: 0 | Status: SHIPPED | OUTPATIENT
Start: 2023-11-14

## 2023-11-14 NOTE — TELEPHONE ENCOUNTER
Medication:   Requested Prescriptions     Pending Prescriptions Disp Refills    predniSONE (DELTASONE) 10 MG tablet [Pharmacy Med Name: predniSONE Oral Tablet 10 MG] 30 tablet 0     Sig: TAKE 4 TABLETS BY MOUTH DAILY FOR 3 DAYS, THEN TAKE 3 TABS DAILY FOR 3 DAYS, THEN TAKE 2 TABS DAILY FOR 3 DAYS, THEN TAKE 1 TAB DAILY FOR 3 DAYS        Last Filled:  8/11/2023    Patient Phone Number: [phone] (home)     Last appt: 7/21/2023   Next appt: Visit date not found    Last OARRS:        No data to display

## 2023-11-17 DIAGNOSIS — G89.29 CHRONIC RIGHT-SIDED LOW BACK PAIN WITH RIGHT-SIDED SCIATICA: ICD-10-CM

## 2023-11-17 DIAGNOSIS — M54.41 CHRONIC RIGHT-SIDED LOW BACK PAIN WITH RIGHT-SIDED SCIATICA: ICD-10-CM

## 2023-11-17 RX ORDER — CYCLOBENZAPRINE HCL 10 MG
10 TABLET ORAL 3 TIMES DAILY PRN
Qty: 15 TABLET | Refills: 0 | Status: SHIPPED | OUTPATIENT
Start: 2023-11-17

## 2023-11-17 NOTE — TELEPHONE ENCOUNTER
----- Message from Jatin Bird sent at 11/16/2023  3:58 PM EST -----  Subject: Message to Provider    QUESTIONS  Information for Provider? Pt sees Abdoul Levine at [facility]   cyclobenzaprine 10 mg for muscle spams- Please let pt know. Thanks PT uses   [address] [place], [place] [phone]   ---------------------------------------------------------------------------  --------------  Roberto Paul INFO  [phone]; OK to leave message on voicemail  ---------------------------------------------------------------------------  --------------  SCRIPT ANSWERS  Relationship to Patient?  Self The pt's father would like to know if he can get some lessons for g tube training here instead of going to [place] as he has no car he has no way to get that far. It was noticed that the pt has an appointment tomorrow, 11/30/21, and I asked him if he would come to that appointment but he stated that there is a restraining order against him and he cannot be near the patient's mother whom will be with the patient at the appointment. Please let the patients father know if g tube lessons can be arranged here. He will be available after 3pm today.

## 2023-11-20 ENCOUNTER — TELEPHONE (OUTPATIENT)
Dept: FAMILY MEDICINE CLINIC | Age: 63
End: 2023-11-20

## 2023-11-20 NOTE — TELEPHONE ENCOUNTER
----- Message from Ivelisse Chaves sent at 11/16/2023  3:58 PM EST -----  Subject: Message to Provider    QUESTIONS  Information for Provider? Pt sees Evan Cooper at [facility] Called   cyclobenzaprine 10 mg for muscle spams- Please let pt know. Thanks PT uses   [id number] - [place], [place] - [address] - P   [phone] - F [phone]   ---------------------------------------------------------------------------  --------------  CALL BACK INFO  [phone]; OK to leave message on voicemail  ---------------------------------------------------------------------------  --------------  SCRIPT ANSWERS  Relationship to Patient? Self

## 2023-11-28 ENCOUNTER — OFFICE VISIT (OUTPATIENT)
Dept: FAMILY MEDICINE CLINIC | Age: 63
End: 2023-11-28
Payer: COMMERCIAL

## 2023-11-28 ENCOUNTER — TELEPHONE (OUTPATIENT)
Dept: FAMILY MEDICINE CLINIC | Age: 63
End: 2023-11-28

## 2023-11-28 VITALS
DIASTOLIC BLOOD PRESSURE: 66 MMHG | WEIGHT: 156 LBS | RESPIRATION RATE: 16 BRPM | HEART RATE: 84 BPM | TEMPERATURE: 96.9 F | OXYGEN SATURATION: 96 % | SYSTOLIC BLOOD PRESSURE: 130 MMHG | BODY MASS INDEX: 25.99 KG/M2 | HEIGHT: 65 IN

## 2023-11-28 DIAGNOSIS — G89.29 CHRONIC RIGHT-SIDED LOW BACK PAIN WITH RIGHT-SIDED SCIATICA: ICD-10-CM

## 2023-11-28 DIAGNOSIS — Z23 NEED FOR VACCINATION: ICD-10-CM

## 2023-11-28 DIAGNOSIS — E53.8 VITAMIN B12 DEFICIENCY: Primary | ICD-10-CM

## 2023-11-28 DIAGNOSIS — M54.2 NECK PAIN: ICD-10-CM

## 2023-11-28 DIAGNOSIS — M54.41 CHRONIC RIGHT-SIDED LOW BACK PAIN WITH RIGHT-SIDED SCIATICA: ICD-10-CM

## 2023-11-28 PROCEDURE — 90750 HZV VACC RECOMBINANT IM: CPT | Performed by: FAMILY MEDICINE

## 2023-11-28 PROCEDURE — G8427 DOCREV CUR MEDS BY ELIG CLIN: HCPCS | Performed by: FAMILY MEDICINE

## 2023-11-28 PROCEDURE — 3017F COLORECTAL CA SCREEN DOC REV: CPT | Performed by: FAMILY MEDICINE

## 2023-11-28 PROCEDURE — 96372 THER/PROPH/DIAG INJ SC/IM: CPT | Performed by: FAMILY MEDICINE

## 2023-11-28 PROCEDURE — 4004F PT TOBACCO SCREEN RCVD TLK: CPT | Performed by: FAMILY MEDICINE

## 2023-11-28 PROCEDURE — 90674 CCIIV4 VAC NO PRSV 0.5 ML IM: CPT | Performed by: FAMILY MEDICINE

## 2023-11-28 PROCEDURE — 90472 IMMUNIZATION ADMIN EACH ADD: CPT | Performed by: FAMILY MEDICINE

## 2023-11-28 PROCEDURE — G8419 CALC BMI OUT NRM PARAM NOF/U: HCPCS | Performed by: FAMILY MEDICINE

## 2023-11-28 PROCEDURE — G8482 FLU IMMUNIZE ORDER/ADMIN: HCPCS | Performed by: FAMILY MEDICINE

## 2023-11-28 PROCEDURE — 99214 OFFICE O/P EST MOD 30 MIN: CPT | Performed by: FAMILY MEDICINE

## 2023-11-28 PROCEDURE — 90471 IMMUNIZATION ADMIN: CPT | Performed by: FAMILY MEDICINE

## 2023-11-28 RX ORDER — CYANOCOBALAMIN 1000 UG/ML
1000 INJECTION, SOLUTION INTRAMUSCULAR; SUBCUTANEOUS ONCE
Status: COMPLETED | OUTPATIENT
Start: 2023-11-28 | End: 2023-11-28

## 2023-11-28 RX ORDER — CYCLOBENZAPRINE HCL 10 MG
10 TABLET ORAL 3 TIMES DAILY PRN
Qty: 15 TABLET | Refills: 0 | Status: SHIPPED | OUTPATIENT
Start: 2023-11-28

## 2023-11-28 RX ORDER — PREDNISONE 10 MG/1
TABLET ORAL
Qty: 30 TABLET | Refills: 0 | Status: SHIPPED | OUTPATIENT
Start: 2023-11-28

## 2023-11-28 RX ADMIN — CYANOCOBALAMIN 1000 MCG: 1000 INJECTION, SOLUTION INTRAMUSCULAR; SUBCUTANEOUS at 16:50

## 2023-11-28 NOTE — TELEPHONE ENCOUNTER
[facility] transfer to Nurse triage    Patient is calling with complaint of Pain and neck and back with tingling in the upper back under his neck  B  Had medication and without it the tingling increases      This has been going on  3months     Patient is scheduled today      Has patient tried any over the counter medications?  no

## 2024-03-26 ENCOUNTER — TELEPHONE (OUTPATIENT)
Dept: CASE MANAGEMENT | Age: 64
End: 2024-03-26

## 2024-05-07 ENCOUNTER — OFFICE VISIT (OUTPATIENT)
Dept: FAMILY MEDICINE CLINIC | Age: 64
End: 2024-05-07
Payer: COMMERCIAL

## 2024-05-07 VITALS
HEIGHT: 65 IN | OXYGEN SATURATION: 98 % | BODY MASS INDEX: 25.66 KG/M2 | WEIGHT: 154 LBS | DIASTOLIC BLOOD PRESSURE: 70 MMHG | HEART RATE: 58 BPM | SYSTOLIC BLOOD PRESSURE: 118 MMHG

## 2024-05-07 DIAGNOSIS — G89.29 CHRONIC RIGHT-SIDED LOW BACK PAIN WITH RIGHT-SIDED SCIATICA: ICD-10-CM

## 2024-05-07 DIAGNOSIS — M54.41 CHRONIC RIGHT-SIDED LOW BACK PAIN WITH RIGHT-SIDED SCIATICA: ICD-10-CM

## 2024-05-07 DIAGNOSIS — R30.0 DYSURIA: Primary | ICD-10-CM

## 2024-05-07 DIAGNOSIS — R10.9 ACUTE RIGHT FLANK PAIN: ICD-10-CM

## 2024-05-07 DIAGNOSIS — R31.29 OTHER MICROSCOPIC HEMATURIA: ICD-10-CM

## 2024-05-07 LAB
BILIRUBIN, POC: NORMAL
BLOOD URINE, POC: NORMAL
CLARITY, POC: NORMAL
COLOR, POC: NORMAL
GLUCOSE URINE, POC: NORMAL
KETONES, POC: NORMAL
LEUKOCYTE EST, POC: NORMAL
NITRITE, POC: NORMAL
PH, POC: NORMAL
PROTEIN, POC: 0.2
SPECIFIC GRAVITY, POC: >=1.03
UROBILINOGEN, POC: NORMAL

## 2024-05-07 PROCEDURE — G8427 DOCREV CUR MEDS BY ELIG CLIN: HCPCS | Performed by: FAMILY MEDICINE

## 2024-05-07 PROCEDURE — 4004F PT TOBACCO SCREEN RCVD TLK: CPT | Performed by: FAMILY MEDICINE

## 2024-05-07 PROCEDURE — 3017F COLORECTAL CA SCREEN DOC REV: CPT | Performed by: FAMILY MEDICINE

## 2024-05-07 PROCEDURE — G8419 CALC BMI OUT NRM PARAM NOF/U: HCPCS | Performed by: FAMILY MEDICINE

## 2024-05-07 PROCEDURE — 81002 URINALYSIS NONAUTO W/O SCOPE: CPT | Performed by: FAMILY MEDICINE

## 2024-05-07 PROCEDURE — 99214 OFFICE O/P EST MOD 30 MIN: CPT | Performed by: FAMILY MEDICINE

## 2024-05-07 RX ORDER — PREDNISONE 10 MG/1
TABLET ORAL
Qty: 30 TABLET | Refills: 0 | Status: SHIPPED | OUTPATIENT
Start: 2024-05-07

## 2024-05-07 RX ORDER — NAPROXEN 500 MG/1
500 TABLET ORAL 2 TIMES DAILY WITH MEALS
Qty: 42 TABLET | Refills: 0 | Status: SHIPPED | OUTPATIENT
Start: 2024-05-07 | End: 2024-05-28

## 2024-05-07 ASSESSMENT — PATIENT HEALTH QUESTIONNAIRE - PHQ9
SUM OF ALL RESPONSES TO PHQ QUESTIONS 1-9: 0
SUM OF ALL RESPONSES TO PHQ9 QUESTIONS 1 & 2: 0
2. FEELING DOWN, DEPRESSED OR HOPELESS: NOT AT ALL
SUM OF ALL RESPONSES TO PHQ QUESTIONS 1-9: 0
1. LITTLE INTEREST OR PLEASURE IN DOING THINGS: NOT AT ALL

## 2024-05-07 NOTE — PROGRESS NOTES
[facility] Medicine  Clinic Note    Date: 5/7/2024                                               /Objective:     Chief Complaint   Patient presents with    Back Pain       HPI  R flank pain starting 2-3 days ago. Became acutely worse yesterday, took Ibuprofen and today it's much better. No urinary frequency or dysuria, though urine smells stronger. No fever. Pain is worse with position changes and bending. Pt has hx of kidney stones and this feels similar but it's not nearly as bad as usual.  Pt also reports that he's having a flare of his R sided sciatica over the last month. Tends to wax and wane if he's lifting.  Denies saddle paresthesia, urinary incontinence, weakness.         Patient Active Problem List    Diagnosis Date Noted    Crohn's disease ([facility]) 05/03/2022       Past Medical History:   Diagnosis Date    Crohn's disease ([facility])        Current Outpatient Medications   Medication Sig Dispense Refill    predniSONE (DELTASONE) 10 MG tablet TAKE 4 TABLETS BY MOUTH DAILY FOR 3 DAYS, THEN TAKE 3 TABS DAILY FOR 3 DAYS, THEN TAKE 2 TABS DAILY FOR 3 DAYS, THEN TAKE 1 TAB DAILY FOR 3 DAYS 30 tablet 0    naproxen (NAPROSYN) 500 MG tablet Take 1 tablet by mouth 2 times daily (with meals) for 21 days 42 tablet 0    buprenorphine-naloxone (SUBOXONE) 8-2 MG SUBL SL tablet        No current facility-administered medications for this visit.       No Known Allergies    Review of Systems  No vomiting, no bruise    Vitals:  /70   Pulse 58   Ht 1.651 m (5' 5\")   Wt 69.9 kg (154 lb)   SpO2 98%   BMI 25.63 kg/m²     Wt Readings from Last 3 Encounters:   05/07/24 69.9 kg (154 lb)   11/28/23 70.8 kg (156 lb)   06/30/23 68 kg (150 lb)        Physical Exam  General:  Well-appearing, NAD, alert, non-toxic  HEENT:  Normocephalic, atraumatic.   CHEST/LUNGS: No dyspnea  EXTREMETIES: Normal movement of all extremities. No edema. No joint swelling.  SKIN:  No rash, no cellulitis, no bruising, no

## 2024-05-21 ENCOUNTER — OFFICE VISIT (OUTPATIENT)
Dept: FAMILY MEDICINE CLINIC | Age: 64
End: 2024-05-21
Payer: COMMERCIAL

## 2024-05-21 VITALS
OXYGEN SATURATION: 98 % | DIASTOLIC BLOOD PRESSURE: 66 MMHG | TEMPERATURE: 97.8 F | HEART RATE: 60 BPM | SYSTOLIC BLOOD PRESSURE: 138 MMHG | BODY MASS INDEX: 25.53 KG/M2 | RESPIRATION RATE: 14 BRPM | WEIGHT: 153.4 LBS

## 2024-05-21 DIAGNOSIS — Z00.00 ANNUAL PHYSICAL EXAM: ICD-10-CM

## 2024-05-21 DIAGNOSIS — Z87.891 HISTORY OF TOBACCO USE: ICD-10-CM

## 2024-05-21 DIAGNOSIS — R31.29 OTHER MICROSCOPIC HEMATURIA: ICD-10-CM

## 2024-05-21 DIAGNOSIS — G89.29 CHRONIC RIGHT-SIDED LOW BACK PAIN WITH RIGHT-SIDED SCIATICA: ICD-10-CM

## 2024-05-21 DIAGNOSIS — R73.03 PREDIABETES: Primary | ICD-10-CM

## 2024-05-21 DIAGNOSIS — R31.29 MICROSCOPIC HEMATURIA: ICD-10-CM

## 2024-05-21 DIAGNOSIS — M54.41 CHRONIC RIGHT-SIDED LOW BACK PAIN WITH RIGHT-SIDED SCIATICA: ICD-10-CM

## 2024-05-21 DIAGNOSIS — Z87.891 PERSONAL HISTORY OF TOBACCO USE: ICD-10-CM

## 2024-05-21 LAB
BILIRUBIN, URINE: NEGATIVE
BLOOD, URINE: POSITIVE
CLARITY: CLEAR
COLOR: YELLOW
GLUCOSE URINE: NEGATIVE
KETONES, URINE: NEGATIVE
LEUKOCYTE ESTERASE, URINE: NEGATIVE
NITRITE, URINE: NEGATIVE
PH UA: 5 (ref 4.5–8)
PROTEIN UA: NEGATIVE
SPECIFIC GRAVITY, URINE: 1.01
UROBILINOGEN, URINE: NORMAL

## 2024-05-21 PROCEDURE — 4004F PT TOBACCO SCREEN RCVD TLK: CPT | Performed by: FAMILY MEDICINE

## 2024-05-21 PROCEDURE — 3017F COLORECTAL CA SCREEN DOC REV: CPT | Performed by: FAMILY MEDICINE

## 2024-05-21 PROCEDURE — G8427 DOCREV CUR MEDS BY ELIG CLIN: HCPCS | Performed by: FAMILY MEDICINE

## 2024-05-21 PROCEDURE — G0296 VISIT TO DETERM LDCT ELIG: HCPCS | Performed by: FAMILY MEDICINE

## 2024-05-21 PROCEDURE — 99396 PREV VISIT EST AGE 40-64: CPT | Performed by: FAMILY MEDICINE

## 2024-05-21 PROCEDURE — 99213 OFFICE O/P EST LOW 20 MIN: CPT | Performed by: FAMILY MEDICINE

## 2024-05-21 PROCEDURE — G8419 CALC BMI OUT NRM PARAM NOF/U: HCPCS | Performed by: FAMILY MEDICINE

## 2024-05-21 RX ORDER — CYCLOBENZAPRINE HCL 10 MG
10 TABLET ORAL 3 TIMES DAILY PRN
Qty: 30 TABLET | Refills: 0 | Status: SHIPPED | OUTPATIENT
Start: 2024-05-21

## 2024-05-21 SDOH — ECONOMIC STABILITY: FOOD INSECURITY: WITHIN THE PAST 12 MONTHS, YOU WORRIED THAT YOUR FOOD WOULD RUN OUT BEFORE YOU GOT MONEY TO BUY MORE.: NEVER TRUE

## 2024-05-21 SDOH — ECONOMIC STABILITY: FOOD INSECURITY: WITHIN THE PAST 12 MONTHS, THE FOOD YOU BOUGHT JUST DIDN'T LAST AND YOU DIDN'T HAVE MONEY TO GET MORE.: NEVER TRUE

## 2024-05-21 SDOH — ECONOMIC STABILITY: INCOME INSECURITY: HOW HARD IS IT FOR YOU TO PAY FOR THE VERY BASICS LIKE FOOD, HOUSING, MEDICAL CARE, AND HEATING?: VERY HARD

## 2024-05-21 NOTE — PROGRESS NOTES
Pt would like a B12 injection   
cyclobenzaprine (FLEXERIL) 10 MG tablet; Take 1 tablet by mouth 3 times daily as needed for Muscle spasms, Disp-30 tablet, R-0Normal  6. Other microscopic hematuria  -     Urinalysis  7. Microscopic hematuria  -     [facility] - Ronnell Mercedes MD, Urology, [facility]       Orders Placed This Encounter   Procedures    CT Lung Screen (Initial/Annual/Baseline)     Age: Patient is 63 y.o.    Smoking History:   Tobacco Use      Smoking status: Every Day        Packs/day: 1.50        Years: 1.5 packs/day for 20.0 years (30.0 ttl pk-yrs)        Types: Cigarettes      Smokeless tobacco: Never      Tobacco comments: Smokes 1 ppd        Date of last lung cancer screenin2023     Standing Status:   Future     Standing Expiration Date:   2025     Order Specific Question:   Is there documentation of shared decision making?     Answer:   Yes     Order Specific Question:   Is this a low dose CT or a routine CT?     Answer:   Low Dose CT [1]     Order Specific Question:   Is this the first (baseline) CT or an annual exam?     Answer:   Annual [2]     Order Specific Question:   Does the patient show any signs or symptoms of lung cancer?     Answer:   No     Order Specific Question:   Smoking Status?     Answer:   Every Day [1]     Order Specific Question:   Pack Years     Answer:   30    CBC with Auto Differential     Standing Status:   Future     Standing Expiration Date:   2025    Comprehensive Metabolic Panel     Standing Status:   Future     Standing Expiration Date:   2025    Hemoglobin A1C     Standing Status:   Future     Standing Expiration Date:   2025    Hepatitis C Antibody     Standing Status:   Future     Standing Expiration Date:   2025    HIV Screen     Standing Status:   Future     Standing Expiration Date:   2025    Lipid, Fasting     Standing Status:   Future     Standing Expiration Date:   2025    TSH with Reflex     Standing Status:   Future     Standing Expiration Date:

## 2024-05-28 ENCOUNTER — TELEPHONE (OUTPATIENT)
Dept: CASE MANAGEMENT | Age: 64
End: 2024-05-28

## 2024-05-28 NOTE — TELEPHONE ENCOUNTER
Patient meets lung screening criteria, age 50-80, >=20 pack year history, current smoker or quit in past 15 years.      Patient due for annual CT Lung Screening. Has Order. Second reminder letter mailed.

## 2024-07-05 ENCOUNTER — TELEPHONE (OUTPATIENT)
Dept: FAMILY MEDICINE CLINIC | Age: 64
End: 2024-07-05

## 2024-07-05 DIAGNOSIS — G89.29 CHRONIC RIGHT-SIDED LOW BACK PAIN WITH RIGHT-SIDED SCIATICA: Primary | ICD-10-CM

## 2024-07-05 DIAGNOSIS — M54.41 CHRONIC RIGHT-SIDED LOW BACK PAIN WITH RIGHT-SIDED SCIATICA: Primary | ICD-10-CM

## 2024-07-05 NOTE — TELEPHONE ENCOUNTER
Patient is needing a referral for pain management       Dr Shabbir hensley MD   [facility]   Office number [phone]  Fax number [phone]    This is the pain management doctor that he would like to see and that his insurance covers.   He would like to have this done he was hoping today if not first thing this morning .

## 2024-07-30 ENCOUNTER — OFFICE VISIT (OUTPATIENT)
Dept: FAMILY MEDICINE CLINIC | Age: 64
End: 2024-07-30
Payer: COMMERCIAL

## 2024-07-30 VITALS
BODY MASS INDEX: 25.99 KG/M2 | RESPIRATION RATE: 14 BRPM | HEIGHT: 65 IN | WEIGHT: 156 LBS | DIASTOLIC BLOOD PRESSURE: 70 MMHG | SYSTOLIC BLOOD PRESSURE: 136 MMHG | HEART RATE: 69 BPM | TEMPERATURE: 97.7 F | OXYGEN SATURATION: 98 %

## 2024-07-30 DIAGNOSIS — R19.7 DIARRHEA, UNSPECIFIED TYPE: Primary | ICD-10-CM

## 2024-07-30 DIAGNOSIS — M54.41 CHRONIC RIGHT-SIDED LOW BACK PAIN WITH RIGHT-SIDED SCIATICA: ICD-10-CM

## 2024-07-30 DIAGNOSIS — B07.0 PLANTAR WARTS: ICD-10-CM

## 2024-07-30 DIAGNOSIS — L82.1 SEBORRHEIC KERATOSIS: ICD-10-CM

## 2024-07-30 DIAGNOSIS — G89.29 CHRONIC RIGHT-SIDED LOW BACK PAIN WITH RIGHT-SIDED SCIATICA: ICD-10-CM

## 2024-07-30 PROCEDURE — 99214 OFFICE O/P EST MOD 30 MIN: CPT | Performed by: FAMILY MEDICINE

## 2024-07-30 PROCEDURE — 4004F PT TOBACCO SCREEN RCVD TLK: CPT | Performed by: FAMILY MEDICINE

## 2024-07-30 PROCEDURE — G8427 DOCREV CUR MEDS BY ELIG CLIN: HCPCS | Performed by: FAMILY MEDICINE

## 2024-07-30 PROCEDURE — 3017F COLORECTAL CA SCREEN DOC REV: CPT | Performed by: FAMILY MEDICINE

## 2024-07-30 PROCEDURE — G8419 CALC BMI OUT NRM PARAM NOF/U: HCPCS | Performed by: FAMILY MEDICINE

## 2024-07-30 PROCEDURE — G2211 COMPLEX E/M VISIT ADD ON: HCPCS | Performed by: FAMILY MEDICINE

## 2024-07-30 RX ORDER — NAPROXEN 500 MG/1
500 TABLET ORAL 2 TIMES DAILY WITH MEALS
Qty: 42 TABLET | Refills: 0 | Status: SHIPPED | OUTPATIENT
Start: 2024-07-30 | End: 2024-08-20

## 2024-07-30 RX ORDER — PREDNISONE 10 MG/1
TABLET ORAL
Qty: 30 TABLET | Refills: 0 | Status: SHIPPED | OUTPATIENT
Start: 2024-07-30

## 2024-07-30 RX ORDER — CYCLOBENZAPRINE HCL 10 MG
10 TABLET ORAL 3 TIMES DAILY PRN
Qty: 15 TABLET | Refills: 0 | Status: SHIPPED | OUTPATIENT
Start: 2024-07-30

## 2024-07-30 ASSESSMENT — PATIENT HEALTH QUESTIONNAIRE - PHQ9
1. LITTLE INTEREST OR PLEASURE IN DOING THINGS: SEVERAL DAYS
SUM OF ALL RESPONSES TO PHQ QUESTIONS 1-9: 2
2. FEELING DOWN, DEPRESSED OR HOPELESS: SEVERAL DAYS
SUM OF ALL RESPONSES TO PHQ QUESTIONS 1-9: 2
SUM OF ALL RESPONSES TO PHQ9 QUESTIONS 1 & 2: 2

## 2024-07-30 NOTE — PROGRESS NOTES
[facility] Medicine  Clinic Note    Date: 7/30/2024                                               /Objective:     Chief Complaint   Patient presents with    Back Pain     Sciatica right side, x2 weeks    Skin Problem     Mole on right temple area seems to be getting bigger, and rougher to the touch    Diarrhea     Pt has crohns and it comes and goes        HPI  Pt reports severe diarrhea over the last few weeks. Started after pt stopped Suboxone about 3 weeks ago. Is having diarrhea 10 times before noon, and then gets better after he takes psyllium, which he takes twice per day. No abdominal pain. No blood in stool. Pt went back on Suboxone a week ago at one-third the dose as before and it's gotten better.     Pt reports flare of his low back pain and R sided sciatica. Denies saddle paresthesia, urinary incontinence, weakness.    Pt reports skin lesion on his R temple that is growing and becoming rougher. Is not painful. No drainage.    Patient has history of flaky skin on the soles of his feet for the past 20 years.  States that it is flaking little more.  Patient often shaves the outer layer of skin off his feet.  Does have some soreness when he stands on the feet.             Patient Active Problem List    Diagnosis Date Noted    Crohn's disease ([facility]) 05/03/2022       Past Medical History:   Diagnosis Date    Crohn's disease ([facility])        Current Outpatient Medications   Medication Sig Dispense Refill    predniSONE (DELTASONE) 10 MG tablet Take 4 tablets on days 1-3, 3 tab on days 4-6, 2 tab on days 7-9, and 1 tab on days 10-12 30 tablet 0    naproxen (NAPROSYN) 500 MG tablet Take 1 tablet by mouth 2 times daily (with meals) for 21 days 42 tablet 0    cyclobenzaprine (FLEXERIL) 10 MG tablet Take 1 tablet by mouth 3 times daily as needed for Muscle spasms 15 tablet 0    buprenorphine-naloxone (SUBOXONE) 8-2 MG SUBL SL tablet       cyclobenzaprine (FLEXERIL) 10 MG tablet Take 1 tablet by mouth 3 times daily

## 2024-08-20 ENCOUNTER — HOSPITAL ENCOUNTER (OUTPATIENT)
Dept: CT IMAGING | Age: 64
Discharge: HOME | End: 2024-08-20
Attending: UROLOGY
Payer: COMMERCIAL

## 2024-08-20 DIAGNOSIS — K50.90 CROHN'S DISEASE WITHOUT COMPLICATION, UNSPECIFIED GASTROINTESTINAL TRACT LOCATION (HCC): ICD-10-CM

## 2024-08-20 DIAGNOSIS — Z87.442 PERSONAL HISTORY OF URINARY CALCULI: ICD-10-CM

## 2024-08-20 DIAGNOSIS — R31.29 OTHER MICROSCOPIC HEMATURIA: ICD-10-CM

## 2024-08-20 DIAGNOSIS — Z72.0 TOBACCO ABUSE: ICD-10-CM

## 2024-08-20 DIAGNOSIS — R31.21 ASYMPTOMATIC MICROSCOPIC HEMATURIA: ICD-10-CM

## 2024-08-20 LAB
PERFORMED ON: NORMAL
POC CREATININE: 1.1 MG/DL (ref 0.8–1.3)
POC SAMPLE TYPE: NORMAL

## 2024-08-20 PROCEDURE — 74178 CT ABD&PLV WO CNTR FLWD CNTR: CPT | Performed by: UROLOGY

## 2024-08-20 PROCEDURE — 82565 ASSAY OF CREATININE: CPT

## 2024-08-20 PROCEDURE — 6360000004 HC RX CONTRAST MEDICATION: Performed by: UROLOGY

## 2024-08-20 RX ADMIN — IOPAMIDOL 75 ML: 755 INJECTION, SOLUTION INTRAVENOUS at 11:55

## 2024-09-16 ENCOUNTER — TELEPHONE (OUTPATIENT)
Dept: FAMILY MEDICINE CLINIC | Age: 64
End: 2024-09-16

## 2024-09-17 DIAGNOSIS — M54.41 CHRONIC RIGHT-SIDED LOW BACK PAIN WITH RIGHT-SIDED SCIATICA: ICD-10-CM

## 2024-09-17 DIAGNOSIS — G89.29 CHRONIC RIGHT-SIDED LOW BACK PAIN WITH RIGHT-SIDED SCIATICA: ICD-10-CM

## 2024-09-17 RX ORDER — PREDNISONE 10 MG/1
TABLET ORAL
Qty: 30 TABLET | Refills: 0 | OUTPATIENT
Start: 2024-09-17

## 2024-09-18 NOTE — TELEPHONE ENCOUNTER
Medication:   Requested Prescriptions     Pending Prescriptions Disp Refills    predniSONE (DELTASONE) 10 MG tablet [Pharmacy Med Name: predniSONE Oral Tablet 10 MG] 30 tablet 0     Sig: TAKE 4 TABLETS BY MOUTH DAILY ON DAYS 1 TO 3,THEN 3 TABS DAILY ON DAYS 4 TO 6, THEN 2 TABS ON DAYS 7 TO 9, AND 1 TAB ON DAYS 10 TO 12        Last Filled:  07/30/2024    Patient Phone Number: [phone] (home)     Last appt: 7/30/2024   Next appt: 9/20/2024    Last OARRS:        No data to display

## 2024-09-19 RX ORDER — PREDNISONE 10 MG/1
TABLET ORAL
Qty: 30 TABLET | Refills: 0 | OUTPATIENT
Start: 2024-09-19

## 2024-09-20 ENCOUNTER — OFFICE VISIT (OUTPATIENT)
Dept: FAMILY MEDICINE CLINIC | Age: 64
End: 2024-09-20
Payer: COMMERCIAL

## 2024-09-20 VITALS
HEART RATE: 58 BPM | SYSTOLIC BLOOD PRESSURE: 120 MMHG | WEIGHT: 157 LBS | OXYGEN SATURATION: 98 % | DIASTOLIC BLOOD PRESSURE: 60 MMHG | BODY MASS INDEX: 26.13 KG/M2

## 2024-09-20 DIAGNOSIS — M54.42 CHRONIC LEFT-SIDED LOW BACK PAIN WITH LEFT-SIDED SCIATICA: Primary | ICD-10-CM

## 2024-09-20 DIAGNOSIS — G89.29 CHRONIC LEFT-SIDED LOW BACK PAIN WITH LEFT-SIDED SCIATICA: Primary | ICD-10-CM

## 2024-09-20 PROCEDURE — 3017F COLORECTAL CA SCREEN DOC REV: CPT | Performed by: FAMILY MEDICINE

## 2024-09-20 PROCEDURE — G8419 CALC BMI OUT NRM PARAM NOF/U: HCPCS | Performed by: FAMILY MEDICINE

## 2024-09-20 PROCEDURE — 4004F PT TOBACCO SCREEN RCVD TLK: CPT | Performed by: FAMILY MEDICINE

## 2024-09-20 PROCEDURE — 99214 OFFICE O/P EST MOD 30 MIN: CPT | Performed by: FAMILY MEDICINE

## 2024-09-20 PROCEDURE — G8427 DOCREV CUR MEDS BY ELIG CLIN: HCPCS | Performed by: FAMILY MEDICINE

## 2024-09-20 RX ORDER — PREDNISONE 10 MG/1
TABLET ORAL
Qty: 30 TABLET | Refills: 0 | Status: SHIPPED | OUTPATIENT
Start: 2024-09-20

## 2024-12-27 DIAGNOSIS — M54.42 CHRONIC LEFT-SIDED LOW BACK PAIN WITH LEFT-SIDED SCIATICA: ICD-10-CM

## 2024-12-27 DIAGNOSIS — G89.29 CHRONIC LEFT-SIDED LOW BACK PAIN WITH LEFT-SIDED SCIATICA: ICD-10-CM

## 2024-12-27 RX ORDER — PREDNISONE 10 MG/1
TABLET ORAL
Qty: 30 TABLET | Refills: 0 | Status: SHIPPED | OUTPATIENT
Start: 2024-12-27

## 2024-12-27 NOTE — TELEPHONE ENCOUNTER
Medication and Quantity requested:      predniSONE (DELTASONE) 10 MG tablet [[id number]]     Last Visit  9-20-24    Pharmacy:    [id number] - [facility] [address] - P [phone] - F [phone]

## 2024-12-27 NOTE — TELEPHONE ENCOUNTER
Medication:   Requested Prescriptions     Pending Prescriptions Disp Refills    predniSONE (DELTASONE) 10 MG tablet 30 tablet 0     Sig: Take 4 tablets on days 1-3, 3 tab on days 4-6, 2 tab on days 7-9, and 1 tab on days 10-12        Last Filled:  9/20/2024, 30, 0    Patient Phone Number: [phone] (home)     Last appt: 9/20/2024   Next appt: Visit date not found    Last OARRS:        No data to display

## 2024-12-30 DIAGNOSIS — G89.29 CHRONIC LEFT-SIDED LOW BACK PAIN WITH LEFT-SIDED SCIATICA: ICD-10-CM

## 2024-12-30 DIAGNOSIS — M54.42 CHRONIC LEFT-SIDED LOW BACK PAIN WITH LEFT-SIDED SCIATICA: ICD-10-CM

## 2024-12-30 RX ORDER — PREDNISONE 10 MG/1
TABLET ORAL
Qty: 30 TABLET | Refills: 0 | OUTPATIENT
Start: 2024-12-30

## 2025-02-28 ENCOUNTER — TELEMEDICINE (OUTPATIENT)
Dept: FAMILY MEDICINE CLINIC | Age: 65
End: 2025-02-28
Payer: COMMERCIAL

## 2025-02-28 DIAGNOSIS — M25.511 ACUTE PAIN OF RIGHT SHOULDER: Primary | ICD-10-CM

## 2025-02-28 PROCEDURE — 3017F COLORECTAL CA SCREEN DOC REV: CPT | Performed by: FAMILY MEDICINE

## 2025-02-28 PROCEDURE — 4004F PT TOBACCO SCREEN RCVD TLK: CPT | Performed by: FAMILY MEDICINE

## 2025-02-28 PROCEDURE — 99213 OFFICE O/P EST LOW 20 MIN: CPT | Performed by: FAMILY MEDICINE

## 2025-02-28 PROCEDURE — G8427 DOCREV CUR MEDS BY ELIG CLIN: HCPCS | Performed by: FAMILY MEDICINE

## 2025-02-28 PROCEDURE — G2211 COMPLEX E/M VISIT ADD ON: HCPCS | Performed by: FAMILY MEDICINE

## 2025-02-28 PROCEDURE — G8419 CALC BMI OUT NRM PARAM NOF/U: HCPCS | Performed by: FAMILY MEDICINE

## 2025-02-28 RX ORDER — PREDNISONE 10 MG/1
TABLET ORAL
Qty: 30 TABLET | Refills: 0 | Status: SHIPPED | OUTPATIENT
Start: 2025-02-28

## 2025-02-28 RX ORDER — CYCLOBENZAPRINE HCL 10 MG
10 TABLET ORAL 3 TIMES DAILY PRN
Qty: 15 TABLET | Refills: 0 | Status: SHIPPED | OUTPATIENT
Start: 2025-02-28

## 2025-02-28 RX ORDER — NAPROXEN 500 MG/1
500 TABLET ORAL 2 TIMES DAILY WITH MEALS
Qty: 42 TABLET | Refills: 0 | Status: SHIPPED | OUTPATIENT
Start: 2025-02-28 | End: 2025-03-21

## 2025-02-28 SDOH — ECONOMIC STABILITY: FOOD INSECURITY: WITHIN THE PAST 12 MONTHS, YOU WORRIED THAT YOUR FOOD WOULD RUN OUT BEFORE YOU GOT MONEY TO BUY MORE.: NEVER TRUE

## 2025-02-28 SDOH — ECONOMIC STABILITY: FOOD INSECURITY: WITHIN THE PAST 12 MONTHS, THE FOOD YOU BOUGHT JUST DIDN'T LAST AND YOU DIDN'T HAVE MONEY TO GET MORE.: NEVER TRUE

## 2025-02-28 ASSESSMENT — PATIENT HEALTH QUESTIONNAIRE - PHQ9
SUM OF ALL RESPONSES TO PHQ QUESTIONS 1-9: 0
1. LITTLE INTEREST OR PLEASURE IN DOING THINGS: NOT AT ALL
SUM OF ALL RESPONSES TO PHQ QUESTIONS 1-9: 0
2. FEELING DOWN, DEPRESSED OR HOPELESS: NOT AT ALL
SUM OF ALL RESPONSES TO PHQ9 QUESTIONS 1 & 2: 0

## 2025-02-28 NOTE — PROGRESS NOTES
[facility]  Clinic Note    Date: 2/28/2025                                               /Objective:     Chief Complaint   Patient presents with    Shoulder Pain     Been ongoing on 4 days last 2 days has been the worst       HPI  R shoulder pain starting 4 days ago after he was lifting some rugs at work. Worse over the last 2 days. Is located mainly in the muscle between the neck and the shoulder joint, though the bones in the shoulder are a bit tender too. Cannot raise the R arm due to pain. Pain is better at rest.       Patient Active Problem List    Diagnosis Date Noted    Crohn's disease ([facility]) 05/03/2022       Past Medical History:   Diagnosis Date    Crohn's disease ([facility])        Current Outpatient Medications   Medication Sig Dispense Refill    predniSONE (DELTASONE) 10 MG tablet Take 4 tablets on days 1-3, 3 tab on days 4-6, 2 tab on days 7-9, and 1 tab on days 10-12 30 tablet 0    naproxen (NAPROSYN) 500 MG tablet Take 1 tablet by mouth 2 times daily (with meals) for 21 days 42 tablet 0    cyclobenzaprine (FLEXERIL) 10 MG tablet Take 1 tablet by mouth 3 times daily as needed for Muscle spasms 15 tablet 0    buprenorphine-naloxone (SUBOXONE) 8-2 MG SUBL SL tablet        No current facility-administered medications for this visit.       No Known Allergies    Review of Systems  No fever, no cough    Vitals:  There were no vitals taken for this visit.    Wt Readings from Last 3 Encounters:   09/20/24 71.2 kg (157 lb)   07/30/24 70.8 kg (156 lb)   05/21/24 69.6 kg (153 lb 6.4 oz)        Physical Exam  General:  Well-appearing, NAD, alert, non-toxic  HEENT:  Normocephalic, atraumatic. Normal appearance of nose.   CHEST/LUNGS: No dyspnea  PSYCH:  A+O x 3; normal affect  NEURO:  GCS 15, CN2-12 grossly intact, normal speech      Assessment/Plan     63yo male with R trapezius/ shoulder pain. Likely due to muscle strain.  Will prednisone, naproxen.  Flexeril as needed for breakthrough pain.  Patient

## 2025-03-25 ENCOUNTER — OFFICE VISIT (OUTPATIENT)
Dept: FAMILY MEDICINE CLINIC | Age: 65
End: 2025-03-25

## 2025-03-25 VITALS
BODY MASS INDEX: 25.66 KG/M2 | DIASTOLIC BLOOD PRESSURE: 60 MMHG | OXYGEN SATURATION: 99 % | HEART RATE: 74 BPM | HEIGHT: 65 IN | WEIGHT: 154 LBS | TEMPERATURE: 97.4 F | SYSTOLIC BLOOD PRESSURE: 120 MMHG

## 2025-03-25 DIAGNOSIS — M54.9 MID BACK PAIN ON RIGHT SIDE: Primary | ICD-10-CM

## 2025-03-25 DIAGNOSIS — E53.8 VITAMIN B12 DEFICIENCY: ICD-10-CM

## 2025-03-25 RX ORDER — NAPROXEN 500 MG/1
500 TABLET ORAL 2 TIMES DAILY WITH MEALS
Qty: 42 TABLET | Refills: 0 | Status: SHIPPED | OUTPATIENT
Start: 2025-03-25 | End: 2025-04-15

## 2025-03-25 RX ORDER — CYANOCOBALAMIN 1000 UG/ML
1000 INJECTION, SOLUTION INTRAMUSCULAR; SUBCUTANEOUS ONCE
Status: COMPLETED | OUTPATIENT
Start: 2025-03-25 | End: 2025-03-25

## 2025-03-25 RX ORDER — PREDNISONE 10 MG/1
TABLET ORAL
Qty: 30 TABLET | Refills: 0 | Status: SHIPPED | OUTPATIENT
Start: 2025-03-25

## 2025-03-25 RX ADMIN — CYANOCOBALAMIN 1000 MCG: 1000 INJECTION, SOLUTION INTRAMUSCULAR; SUBCUTANEOUS at 11:31

## 2025-03-25 SDOH — ECONOMIC STABILITY: FOOD INSECURITY: WITHIN THE PAST 12 MONTHS, THE FOOD YOU BOUGHT JUST DIDN'T LAST AND YOU DIDN'T HAVE MONEY TO GET MORE.: NEVER TRUE

## 2025-03-25 SDOH — ECONOMIC STABILITY: FOOD INSECURITY: WITHIN THE PAST 12 MONTHS, YOU WORRIED THAT YOUR FOOD WOULD RUN OUT BEFORE YOU GOT MONEY TO BUY MORE.: NEVER TRUE

## 2025-03-25 NOTE — PROGRESS NOTES
[facility] Medicine  Clinic Note    Date: 3/25/2025                                               /Objective:     Chief Complaint   Patient presents with    Back Pain     Started 1 week ago, midback, hurts to breathe    Blurred Vision       HPI  R sided mid back pain starting 1 week ago.  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  He has had this problem a few times in the past.  Does not radiate. Hurts worse when taking a deep breath. Hurts worse with lifting things. Overall is just a little better.         Patient Active Problem List    Diagnosis Date Noted    Crohn's disease ([facility]) 05/03/2022       Past Medical History:   Diagnosis Date    Crohn's disease ([facility])        Current Outpatient Medications   Medication Sig Dispense Refill    predniSONE (DELTASONE) 10 MG tablet Take 4 tablets on days 1-3, 3 tab on days 4-6, 2 tab on days 7-9, and 1 tab on days 10-12 30 tablet 0    naproxen (NAPROSYN) 500 MG tablet Take 1 tablet by mouth 2 times daily (with meals) for 21 days 42 tablet 0    buprenorphine-naloxone (SUBOXONE) 8-2 MG SUBL SL tablet        No current facility-administered medications for this visit.       No Known Allergies    Review of Systems  No SOB, no fever    Vitals:  /60   Pulse 74   Temp 97.4 °F (36.3 °C)   Ht 1.651 m (5' 5\")   Wt 69.9 kg (154 lb)   SpO2 99%   BMI 25.63 kg/m²     Wt Readings from Last 3 Encounters:   03/25/25 69.9 kg (154 lb)   09/20/24 71.2 kg (157 lb)   07/30/24 70.8 kg (156 lb)        Physical Exam  General:  Well-appearing, NAD, alert, non-toxic  HEENT:  Normocephalic, atraumatic.   CHEST/LUNGS: No dyspnea  EXTREMETIES: Normal movement of all extremities. No edema. No joint swelling.  SKIN:  No rash, no cellulitis, no bruising, no petechiae/purpura/vesicles/pustules/abscess  PSYCH:  A+O x 3; normal affect  NEURO:  GCS 15, CN2-12 grossly intact, no focal motor/sensory deficits, normal gait, normal speech      Assessment/Plan     63yo

## 2025-05-15 DIAGNOSIS — M54.9 MID BACK PAIN ON RIGHT SIDE: ICD-10-CM

## 2025-05-15 NOTE — TELEPHONE ENCOUNTER
Medication and Quantity requested:   predniSONE (DELTASONE) 10 MG tablet   Patient needs a refill due to his sciatica pain     Last Visit  3/25/2025    Pharmacy and phone number updated in [facility]:  yes    [id number] - [facility] [address] - P [phone] - F [phone]

## 2025-05-15 NOTE — TELEPHONE ENCOUNTER
Last OV: 3/25/2025  Next OV: Visit date not found    Next appointment due:    Last fill:03/25/2025  Refills:30/0

## 2025-05-16 RX ORDER — PREDNISONE 10 MG/1
TABLET ORAL
Qty: 30 TABLET | Refills: 0 | Status: SHIPPED | OUTPATIENT
Start: 2025-05-16

## 2025-07-01 DIAGNOSIS — M54.9 MID BACK PAIN ON RIGHT SIDE: ICD-10-CM

## 2025-07-01 RX ORDER — PREDNISONE 10 MG/1
TABLET ORAL
Qty: 30 TABLET | Refills: 0 | OUTPATIENT
Start: 2025-07-01

## 2025-07-03 DIAGNOSIS — M54.9 MID BACK PAIN ON RIGHT SIDE: ICD-10-CM

## 2025-07-03 RX ORDER — PREDNISONE 10 MG/1
TABLET ORAL
Qty: 30 TABLET | Refills: 0 | Status: CANCELLED | OUTPATIENT
Start: 2025-07-03

## 2025-07-08 ENCOUNTER — OFFICE VISIT (OUTPATIENT)
Dept: FAMILY MEDICINE CLINIC | Age: 65
End: 2025-07-08

## 2025-07-08 VITALS
DIASTOLIC BLOOD PRESSURE: 79 MMHG | WEIGHT: 150 LBS | TEMPERATURE: 97.5 F | HEART RATE: 78 BPM | BODY MASS INDEX: 24.96 KG/M2 | SYSTOLIC BLOOD PRESSURE: 142 MMHG | OXYGEN SATURATION: 98 %

## 2025-07-08 DIAGNOSIS — M54.41 CHRONIC RIGHT-SIDED LOW BACK PAIN WITH RIGHT-SIDED SCIATICA: ICD-10-CM

## 2025-07-08 DIAGNOSIS — Z00.00 ANNUAL PHYSICAL EXAM: Primary | ICD-10-CM

## 2025-07-08 DIAGNOSIS — G89.29 CHRONIC RIGHT-SIDED LOW BACK PAIN WITH RIGHT-SIDED SCIATICA: ICD-10-CM

## 2025-07-08 RX ORDER — PREDNISONE 10 MG/1
TABLET ORAL
Qty: 30 TABLET | Refills: 0 | OUTPATIENT
Start: 2025-07-08

## 2025-07-08 RX ORDER — PREDNISONE 10 MG/1
TABLET ORAL
Qty: 30 TABLET | Refills: 0 | Status: SHIPPED | OUTPATIENT
Start: 2025-07-08

## 2025-07-08 NOTE — PROGRESS NOTES
[facility]  Clinic Note    Date: 7/8/2025                                               /Objective:     Chief Complaint   Patient presents with    Back Pain     2 weeks        HPI  R sided low back pain starting 2 weeks ago. Radiates down R buttock to posterior R upper leg to the knee. Low back pain is constant, radiates only with movement/ position changes. Getting a little better overall. No urinary incontinence or numbness or weakness.         Patient Active Problem List    Diagnosis Date Noted    Crohn's disease ([facility]) 05/03/2022       Past Medical History:   Diagnosis Date    Crohn's disease ([facility])        Current Outpatient Medications   Medication Sig Dispense Refill    predniSONE (DELTASONE) 10 MG tablet Take 4 tablets on days 1-3, 3 tab on days 4-6, 2 tab on days 7-9, and 1 tab on days 10-12 30 tablet 0    buprenorphine-naloxone (SUBOXONE) 8-2 MG SUBL SL tablet       naproxen (NAPROSYN) 500 MG tablet Take 1 tablet by mouth 2 times daily (with meals) for 21 days 42 tablet 0     No current facility-administered medications for this visit.       No Known Allergies    Review of Systems  No fever, no vomiting    Vitals:  BP (!) 142/79   Pulse 78   Temp 97.5 °F (36.4 °C)   Wt 68 kg (150 lb)   SpO2 98%   BMI 24.96 kg/m²     Wt Readings from Last 3 Encounters:   07/08/25 68 kg (150 lb)   03/25/25 69.9 kg (154 lb)   09/20/24 71.2 kg (157 lb)        Physical Exam  General:  Well-appearing, NAD, alert, non-toxic  HEENT:  Normocephalic, atraumatic.   CHEST/LUNGS: No dyspnea  EXTREMETIES: Normal movement of all extremities. No edema. No joint swelling.  SKIN:  No rash, no cellulitis, no bruising, no petechiae/purpura/vesicles/pustules/abscess  PSYCH:  A+O x 3; normal affect  NEURO:  GCS 15, CN2-12 grossly intact, no focal motor/sensory deficits, +antalgic gait, normal speech      Assessment/Plan     65yo male with recurrent R low back pain. Will treat with prednisone taper, as it has worked well for

## 2025-08-08 ENCOUNTER — TELEPHONE (OUTPATIENT)
Dept: FAMILY MEDICINE CLINIC | Age: 65
End: 2025-08-08

## 2025-08-08 DIAGNOSIS — G89.29 CHRONIC RIGHT-SIDED LOW BACK PAIN WITH RIGHT-SIDED SCIATICA: ICD-10-CM

## 2025-08-08 DIAGNOSIS — M54.41 CHRONIC RIGHT-SIDED LOW BACK PAIN WITH RIGHT-SIDED SCIATICA: ICD-10-CM

## 2025-08-08 RX ORDER — PREDNISONE 10 MG/1
TABLET ORAL
Qty: 30 TABLET | Refills: 0 | Status: SHIPPED | OUTPATIENT
Start: 2025-08-08